# Patient Record
Sex: MALE | Race: WHITE | Employment: FULL TIME | URBAN - NONMETROPOLITAN AREA
[De-identification: names, ages, dates, MRNs, and addresses within clinical notes are randomized per-mention and may not be internally consistent; named-entity substitution may affect disease eponyms.]

---

## 2019-01-17 ENCOUNTER — HOSPITAL ENCOUNTER (OUTPATIENT)
Age: 54
Discharge: HOME | End: 2019-01-17
Payer: COMMERCIAL

## 2019-01-17 ENCOUNTER — HOSPITAL ENCOUNTER (OUTPATIENT)
Dept: GENERAL RADIOLOGY | Age: 54
Discharge: HOME | End: 2019-01-17
Payer: COMMERCIAL

## 2019-01-17 DIAGNOSIS — M79.671 RIGHT FOOT PAIN: ICD-10-CM

## 2019-01-17 PROCEDURE — 73630 X-RAY EXAM OF FOOT: CPT

## 2021-01-20 ENCOUNTER — TELEPHONE (OUTPATIENT)
Dept: SURGERY | Age: 56
End: 2021-01-20

## 2021-02-03 NOTE — PROGRESS NOTES
Mira Serrato MD   General Surgery  New Patient Evaluation in Office  Pt Name: Purnima Everett  Date of Birth 1965   Today's Date: 2/4/2021  Medical Record Number: [id number]  Referring Provider: Brianna Antoine CNP  Primary Care Provider: SONY Boo - CNP  Chief Complaint:  Chief Complaint   Patient presents with    Surgical Consult     NP-mass of left shoulder ref T. Kleman       ASSESSMENT      1. Soft tissue mass    2. Obesity BMI of 49     PLANS      1. Schedule patient for excision of large soft tissue mass left posterior shoulder  2. MAC anesthesia  3. Preoperative testing per anesthesia guidelines. Patient had Covid in December with a positive Covid test.  4.  Risk procedure were discussed with the patient. Risks include but not limited to bleeding, infection, recurrence, scar as well as small potential for malignant diagnoses. Mr. Meche Lima expressed understanding wish to proceed with surgical intervention. Monty Resendez is a 48y.o. year old male who is presenting today in the office for evaluation of a large soft tissue mass left posterior shoulder. Mr. Meche Lima states it has been there for at least 4 months and has rapidly increased in size. He has had no erythema or chills. He had Covid in December but has no residual symptoms per his report. He also complains of left shoulder pain but has no decreased range of motion. He has no history of trauma. He has a large well-circumscribed oval soft tissue mass 10 cm at least in length. Grossly consistent with lipoma. Excision is needed for symptoms and determination of pathology. Small risk of sarcomatous lesion.   4 months large shoulder pain  Past Medical History  Past Medical History:   Diagnosis Date    COVID-19 12/2020    Mass of skin of left shoulder     Obesity        Past Surgical History  Past Surgical History:   Procedure Laterality Date    FOOT SURGERY Left     Dr. Jozef Matias Left Dr Valerio Klein Right     scope       Medications  Current Outpatient Medications   Medication Sig Dispense Refill    ibuprofen (ADVIL;MOTRIN) 400 MG tablet Take 400 mg by mouth every 6 hours as needed for Pain       No current facility-administered medications for this visit.       Allergies   No Known Allergies    Family History  Family History   Problem Relation Age of Onset    No Known Problems Mother     Cancer Father         skin    No Known Problems Brother     No Known Problems Maternal Grandmother     Lung Cancer Maternal Grandfather     COPD Maternal Grandfather     Lung Cancer Paternal Grandmother     No Known Problems Paternal Grandfather        SocialHistory  Social History     Socioeconomic History    Marital status:      Spouse name: Not on file    Number of children: 1    Years of education: Not on file    Highest education level: Not on file   Occupational History    Not on file   Social Needs    Financial resource strain: Not on file    Food insecurity     Worry: Not on file     Inability: Not on file   [employer] needs     Medical: Not on file     Non-medical: Not on file   Tobacco Use    Smoking status: Former Smoker    Smokeless tobacco: Never Used   Substance and Sexual Activity    Alcohol use: Yes     Comment: every other weekend-beer   Raúl.Adelitaas Drug use: Not Currently    Sexual activity: Not on file   Lifestyle    Physical activity     Days per week: Not on file     Minutes per session: Not on file    Stress: Not on file   Relationships    Social connections     Talks on phone: Not on file     Gets together: Not on file     Attends Shinto service: Not on file     Active member of club or organization: Not on file     Attends meetings of clubs or organizations: Not on file     Relationship status: Not on file    Intimate partner violence     Fear of current or ex partner: Not on file     Emotionally abused: Not on file Physically abused: Not on file     Forced sexual activity: Not on file   Other Topics Concern    Not on file   Social History Narrative    Not on file           Review of Systems  Review of Systems   Constitutional: Negative for chills, fatigue, fever and unexpected weight change. HENT: Negative for sore throat, trouble swallowing and voice change. Eyes: Negative for visual disturbance. Respiratory: Negative for cough, shortness of breath and wheezing. Cardiovascular: Negative for chest pain and palpitations. Gastrointestinal: Negative for abdominal pain, blood in stool, nausea and vomiting. Endocrine: Negative for cold intolerance, heat intolerance and polydipsia. Genitourinary: Negative for dysuria, flank pain and hematuria. Musculoskeletal: Negative for gait problem, joint swelling and myalgias. Left shoulder pain   Skin: Negative for color change, rash and wound. Left posterior shoulder for 4 months   Allergic/Immunologic: Negative for immunocompromised state. Neurological: Negative for dizziness, tremors, seizures and speech difficulty. Hematological: Does not bruise/bleed easily. Psychiatric/Behavioral: Negative for behavioral problems and confusion. OBJECTIVE     /60 (Site: Left Upper Arm, Position: Sitting, Cuff Size: Medium Adult)   Pulse 88   Temp 97.3 °F (36.3 °C) (Temporal)   Resp 18   Ht 6' 1\" (1.854 m)   Wt (!) 376 lb (170.6 kg)   SpO2 97%   BMI 49.61 kg/m²      Physical Exam  Vitals signs reviewed. Constitutional:       Appearance: Normal appearance. He is well-developed. He is obese. He is not ill-appearing or diaphoretic. HENT:      Head: Normocephalic and atraumatic. Nose: No congestion. Eyes:      General: No scleral icterus. Extraocular Movements: Extraocular movements intact. Pupils: Pupils are equal, round, and reactive to light. Neck:      Musculoskeletal: Normal range of motion and neck supple. Thyroid: No thyromegaly. Vascular: No JVD. Trachea: No tracheal deviation. Cardiovascular:      Rate and Rhythm: Normal rate and regular rhythm. Heart sounds: Normal heart sounds. Pulmonary:      Effort: Pulmonary effort is normal. No respiratory distress. Breath sounds: Normal breath sounds. No wheezing. Abdominal:      General: There is no distension. Palpations: Abdomen is soft. Musculoskeletal: Normal range of motion. Lymphadenopathy:      Cervical: No cervical adenopathy. Skin:     General: Skin is warm and dry. Coloration: Skin is not jaundiced or pale. Findings: No bruising or rash. Neurological:      General: No focal deficit present. Mental Status: He is alert and oriented to person, place, and time. Cranial Nerves: No cranial nerve deficit. Psychiatric:         Mood and Affect: Mood normal.         Thought Content:  Thought content normal.         No results found for: WBC, HGB, HCT, PLT, CHOL, TRIG, HDL, LDLDIRECT, ALT, AST, NA, K, CL, CREATININE, BUN, CO2, TSH, PSA, INR, GLUF, LABA1C, LABMICR

## 2021-02-04 ENCOUNTER — TELEPHONE (OUTPATIENT)
Dept: SURGERY | Age: 56
End: 2021-02-04

## 2021-02-04 ENCOUNTER — OFFICE VISIT (OUTPATIENT)
Dept: SURGERY | Age: 56
End: 2021-02-04
Payer: COMMERCIAL

## 2021-02-04 VITALS
WEIGHT: 315 LBS | TEMPERATURE: 97.3 F | OXYGEN SATURATION: 97 % | DIASTOLIC BLOOD PRESSURE: 60 MMHG | HEART RATE: 88 BPM | SYSTOLIC BLOOD PRESSURE: 118 MMHG | HEIGHT: 73 IN | BODY MASS INDEX: 41.75 KG/M2 | RESPIRATION RATE: 18 BRPM

## 2021-02-04 DIAGNOSIS — M79.89 SOFT TISSUE MASS: Primary | ICD-10-CM

## 2021-02-04 PROCEDURE — 99242 OFF/OP CONSLTJ NEW/EST SF 20: CPT | Performed by: SURGERY

## 2021-02-04 RX ORDER — IBUPROFEN 400 MG/1
800 TABLET ORAL EVERY 4 HOURS
COMMUNITY

## 2021-02-04 SDOH — ECONOMIC STABILITY: FOOD INSECURITY: WITHIN THE PAST 12 MONTHS, YOU WORRIED THAT YOUR FOOD WOULD RUN OUT BEFORE YOU GOT MONEY TO BUY MORE.: NOT ASKED

## 2021-02-04 SDOH — ECONOMIC STABILITY: TRANSPORTATION INSECURITY
IN THE PAST 12 MONTHS, HAS THE LACK OF TRANSPORTATION KEPT YOU FROM MEDICAL APPOINTMENTS OR FROM GETTING MEDICATIONS?: NOT ASKED

## 2021-02-04 ASSESSMENT — ENCOUNTER SYMPTOMS
WHEEZING: 0
VOMITING: 0
VOICE CHANGE: 0
ABDOMINAL PAIN: 0
TROUBLE SWALLOWING: 0
NAUSEA: 0
SHORTNESS OF BREATH: 0
BLOOD IN STOOL: 0
SORE THROAT: 0
COLOR CHANGE: 0
COUGH: 0

## 2021-02-04 NOTE — LETTER
[address] Surgery  [place] [address] [address] [facility] [place] [address]  Phone: [phone]  Fax: [phone]    Rhonda Hernandez MD        February 4, 2021    Patient: Lukasz Evangelista   MR Number: [id number]   YOB: 1965   Date of Visit: 2/4/2021     Dear Namrata Moran,    Thank you for the request for consultation for Lukasz Evangelista to me for the evaluation of a symptomatic soft tissue mass left posterior shoulder. Below are the relevant portions of my assessment and plan of care. 1. Soft tissue mass    2. Obesity BMI of 49    1. Schedule patient for excision of large soft tissue mass left posterior shoulder  2. MAC anesthesia  3. Preoperative testing per anesthesia guidelines. Patient had Covid in December with a positive Covid test.  4.  Risk procedure were discussed with the patient. Risks include but not limited to bleeding, infection, recurrence, scar as well as small potential for malignant diagnoses. Mr. Elizabeth Blackwell expressed understanding wish to proceed with surgical intervention. If you have questions, please do not hesitate to call me. I look forward to following Shahzad along with you.     Sincerely,          Rhonda Hernandez MD

## 2021-02-04 NOTE — TELEPHONE ENCOUNTER
Ariel charlton/ Virgen @ [facility].  No prior authorization is required for CPT code [id number] under patient's plan

## 2021-02-10 ENCOUNTER — ANESTHESIA EVENT (OUTPATIENT)
Dept: OPERATING ROOM | Age: 56
End: 2021-02-10
Payer: COMMERCIAL

## 2021-02-10 ENCOUNTER — HOSPITAL ENCOUNTER (OUTPATIENT)
Age: 56
Setting detail: OUTPATIENT SURGERY
Discharge: HOME | End: 2021-02-10
Attending: SURGERY | Admitting: SURGERY
Payer: COMMERCIAL

## 2021-02-10 ENCOUNTER — ANESTHESIA (OUTPATIENT)
Dept: OPERATING ROOM | Age: 56
End: 2021-02-10
Payer: COMMERCIAL

## 2021-02-10 VITALS
WEIGHT: 315 LBS | HEIGHT: 73 IN | SYSTOLIC BLOOD PRESSURE: 132 MMHG | HEART RATE: 86 BPM | OXYGEN SATURATION: 95 % | BODY MASS INDEX: 41.75 KG/M2 | RESPIRATION RATE: 14 BRPM | TEMPERATURE: 97.2 F | DIASTOLIC BLOOD PRESSURE: 60 MMHG

## 2021-02-10 VITALS
SYSTOLIC BLOOD PRESSURE: 114 MMHG | OXYGEN SATURATION: 96 % | TEMPERATURE: 96.8 F | DIASTOLIC BLOOD PRESSURE: 56 MMHG | RESPIRATION RATE: 11 BRPM

## 2021-02-10 DIAGNOSIS — M79.89 MASS OF SOFT TISSUE OF LEFT UPPER EXTREMITY: Primary | ICD-10-CM

## 2021-02-10 PROCEDURE — 6360000002 HC RX W HCPCS: Performed by: SURGERY

## 2021-02-10 PROCEDURE — 3600000013 HC SURGERY LEVEL 3 ADDTL 15MIN: Performed by: SURGERY

## 2021-02-10 PROCEDURE — 3700000001 HC ADD 15 MINUTES (ANESTHESIA): Performed by: SURGERY

## 2021-02-10 PROCEDURE — 2500000003 HC RX 250 WO HCPCS: Performed by: SURGERY

## 2021-02-10 PROCEDURE — 23073 EXC SHOULDER TUM DEEP 5 CM/>: CPT | Performed by: SURGERY

## 2021-02-10 PROCEDURE — 6360000002 HC RX W HCPCS: Performed by: NURSE ANESTHETIST, CERTIFIED REGISTERED

## 2021-02-10 PROCEDURE — 2500000003 HC RX 250 WO HCPCS: Performed by: NURSE ANESTHETIST, CERTIFIED REGISTERED

## 2021-02-10 PROCEDURE — 3600000003 HC SURGERY LEVEL 3 BASE: Performed by: SURGERY

## 2021-02-10 PROCEDURE — 2580000003 HC RX 258: Performed by: SURGERY

## 2021-02-10 PROCEDURE — 3700000000 HC ANESTHESIA ATTENDED CARE: Performed by: SURGERY

## 2021-02-10 PROCEDURE — 7100000010 HC PHASE II RECOVERY - FIRST 15 MIN: Performed by: SURGERY

## 2021-02-10 PROCEDURE — 2709999900 HC NON-CHARGEABLE SUPPLY: Performed by: SURGERY

## 2021-02-10 PROCEDURE — 7100000011 HC PHASE II RECOVERY - ADDTL 15 MIN: Performed by: SURGERY

## 2021-02-10 PROCEDURE — 88304 TISSUE EXAM BY PATHOLOGIST: CPT

## 2021-02-10 RX ORDER — SODIUM CHLORIDE 9 MG/ML
INJECTION, SOLUTION INTRAVENOUS CONTINUOUS
Status: DISCONTINUED | OUTPATIENT
Start: 2021-02-10 | End: 2021-02-10 | Stop reason: HOSPADM

## 2021-02-10 RX ORDER — MORPHINE SULFATE 2 MG/ML
2 INJECTION, SOLUTION INTRAMUSCULAR; INTRAVENOUS
Status: DISCONTINUED | OUTPATIENT
Start: 2021-02-10 | End: 2021-02-10 | Stop reason: HOSPADM

## 2021-02-10 RX ORDER — CEFAZOLIN SODIUM 1 G/3ML
INJECTION, POWDER, FOR SOLUTION INTRAMUSCULAR; INTRAVENOUS PRN
Status: DISCONTINUED | OUTPATIENT
Start: 2021-02-10 | End: 2021-02-10 | Stop reason: SDUPTHER

## 2021-02-10 RX ORDER — SODIUM CHLORIDE 0.9 % (FLUSH) 0.9 %
10 SYRINGE (ML) INJECTION PRN
Status: DISCONTINUED | OUTPATIENT
Start: 2021-02-10 | End: 2021-02-10 | Stop reason: HOSPADM

## 2021-02-10 RX ORDER — LIDOCAINE HYDROCHLORIDE 20 MG/ML
INJECTION, SOLUTION EPIDURAL; INFILTRATION; INTRACAUDAL; PERINEURAL PRN
Status: DISCONTINUED | OUTPATIENT
Start: 2021-02-10 | End: 2021-02-10 | Stop reason: SDUPTHER

## 2021-02-10 RX ORDER — HYDROCODONE BITARTRATE AND ACETAMINOPHEN 5; 325 MG/1; MG/1
1 TABLET ORAL EVERY 4 HOURS PRN
Status: DISCONTINUED | OUTPATIENT
Start: 2021-02-10 | End: 2021-02-10 | Stop reason: HOSPADM

## 2021-02-10 RX ORDER — MORPHINE SULFATE 2 MG/ML
4 INJECTION, SOLUTION INTRAMUSCULAR; INTRAVENOUS
Status: DISCONTINUED | OUTPATIENT
Start: 2021-02-10 | End: 2021-02-10 | Stop reason: HOSPADM

## 2021-02-10 RX ORDER — ONDANSETRON 2 MG/ML
4 INJECTION INTRAMUSCULAR; INTRAVENOUS EVERY 6 HOURS PRN
Status: DISCONTINUED | OUTPATIENT
Start: 2021-02-10 | End: 2021-02-10 | Stop reason: HOSPADM

## 2021-02-10 RX ORDER — BUPIVACAINE HYDROCHLORIDE 5 MG/ML
INJECTION, SOLUTION EPIDURAL; INTRACAUDAL PRN
Status: DISCONTINUED | OUTPATIENT
Start: 2021-02-10 | End: 2021-02-10 | Stop reason: ALTCHOICE

## 2021-02-10 RX ORDER — HYDROCODONE BITARTRATE AND ACETAMINOPHEN 5; 325 MG/1; MG/1
2 TABLET ORAL EVERY 4 HOURS PRN
Status: DISCONTINUED | OUTPATIENT
Start: 2021-02-10 | End: 2021-02-10 | Stop reason: HOSPADM

## 2021-02-10 RX ORDER — IBUPROFEN 600 MG/1
600 TABLET ORAL EVERY 6 HOURS PRN
Status: DISCONTINUED | OUTPATIENT
Start: 2021-02-10 | End: 2021-02-10 | Stop reason: HOSPADM

## 2021-02-10 RX ORDER — SODIUM CHLORIDE 0.9 % (FLUSH) 0.9 %
10 SYRINGE (ML) INJECTION EVERY 12 HOURS SCHEDULED
Status: DISCONTINUED | OUTPATIENT
Start: 2021-02-10 | End: 2021-02-10 | Stop reason: HOSPADM

## 2021-02-10 RX ORDER — HYDROCODONE BITARTRATE AND ACETAMINOPHEN 5; 325 MG/1; MG/1
1 TABLET ORAL EVERY 4 HOURS PRN
Qty: 18 TABLET | Refills: 0 | Status: SHIPPED | OUTPATIENT
Start: 2021-02-10 | End: 2021-02-13

## 2021-02-10 RX ORDER — MIDAZOLAM HYDROCHLORIDE 1 MG/ML
INJECTION INTRAMUSCULAR; INTRAVENOUS PRN
Status: DISCONTINUED | OUTPATIENT
Start: 2021-02-10 | End: 2021-02-10 | Stop reason: SDUPTHER

## 2021-02-10 RX ORDER — PROMETHAZINE HYDROCHLORIDE 25 MG/1
12.5 TABLET ORAL EVERY 6 HOURS PRN
Status: DISCONTINUED | OUTPATIENT
Start: 2021-02-10 | End: 2021-02-10 | Stop reason: HOSPADM

## 2021-02-10 RX ORDER — LIDOCAINE HYDROCHLORIDE 20 MG/ML
INJECTION, SOLUTION EPIDURAL; INFILTRATION; INTRACAUDAL; PERINEURAL PRN
Status: DISCONTINUED | OUTPATIENT
Start: 2021-02-10 | End: 2021-02-10 | Stop reason: ALTCHOICE

## 2021-02-10 RX ORDER — PROPOFOL 10 MG/ML
INJECTION, EMULSION INTRAVENOUS PRN
Status: DISCONTINUED | OUTPATIENT
Start: 2021-02-10 | End: 2021-02-10 | Stop reason: SDUPTHER

## 2021-02-10 RX ORDER — FENTANYL CITRATE 50 UG/ML
INJECTION, SOLUTION INTRAMUSCULAR; INTRAVENOUS PRN
Status: DISCONTINUED | OUTPATIENT
Start: 2021-02-10 | End: 2021-02-10 | Stop reason: SDUPTHER

## 2021-02-10 RX ADMIN — PROPOFOL 170 MG: 10 INJECTION, EMULSION INTRAVENOUS at 10:15

## 2021-02-10 RX ADMIN — CEFAZOLIN 2 G: 10 INJECTION, POWDER, FOR SOLUTION INTRAVENOUS at 10:19

## 2021-02-10 RX ADMIN — FENTANYL CITRATE 50 MCG: 50 INJECTION, SOLUTION INTRAMUSCULAR; INTRAVENOUS at 10:29

## 2021-02-10 RX ADMIN — MIDAZOLAM HYDROCHLORIDE 2 MG: 1 INJECTION, SOLUTION INTRAMUSCULAR; INTRAVENOUS at 10:09

## 2021-02-10 RX ADMIN — SODIUM CHLORIDE: 9 INJECTION, SOLUTION INTRAVENOUS at 10:09

## 2021-02-10 RX ADMIN — FENTANYL CITRATE 50 MCG: 50 INJECTION, SOLUTION INTRAMUSCULAR; INTRAVENOUS at 10:23

## 2021-02-10 RX ADMIN — CEFAZOLIN 1000 MG: 1 INJECTION, POWDER, FOR SOLUTION INTRAMUSCULAR; INTRAVENOUS; PARENTERAL at 10:19

## 2021-02-10 RX ADMIN — LIDOCAINE HYDROCHLORIDE 60 MG: 20 INJECTION, SOLUTION EPIDURAL; INFILTRATION; INTRACAUDAL; PERINEURAL at 10:15

## 2021-02-10 ASSESSMENT — PAIN - FUNCTIONAL ASSESSMENT: PAIN_FUNCTIONAL_ASSESSMENT: 0-10

## 2021-02-10 ASSESSMENT — PAIN SCALES - GENERAL: PAINLEVEL_OUTOF10: 0

## 2021-02-10 NOTE — ANESTHESIA POSTPROCEDURE EVALUATION
Department of Anesthesiology  Postprocedure Note    Patient: Cam Trujillo  MRN: [id number]  YOB: 1965  Date of evaluation: 2/10/2021  Time:  10:54 AM     Procedure Summary     Date: 02/10/21 Room / Location: [facility] 04 / [address] [facility]    Anesthesia Start: 1009 Anesthesia Stop: 1054    Procedure: EXCISION LARGE MASS LEFT POSTERIOR SHOULDER (Left ) Diagnosis: (LEFT POSTERIOR SHOULDER MASS)    Surgeons: Kolby Wilcox MD Responsible Provider: Rebecca Arroyo DO    Anesthesia Type: MAC ASA Status: 3          Anesthesia Type: MAC    Bird Phase I:      Bird Phase II:      Last vitals: Reviewed and per EMR flowsheets.        Anesthesia Post Evaluation    Patient location during evaluation: PACU  Patient participation: complete - patient participated  Level of consciousness: awake and alert  Airway patency: patent  Nausea & Vomiting: no nausea and no vomiting  Complications: no  Cardiovascular status: hemodynamically stable  Respiratory status: spontaneous ventilation and acceptable  Hydration status: euvolemic

## 2021-02-10 NOTE — BRIEF OP NOTE
Brief Postoperative Note      Patient: Cam Trujillo  YOB: 1965  MRN: [id number]    Date of Procedure: 2/10/2021    Pre-Op Diagnosis: LEFT POSTERIOR SHOULDER MASS    Post-Op Diagnosis: Same  11 cm subfascial       Procedure(s):  EXCISION LARGE MASS LEFT POSTERIOR SHOULDER, 11 cm subfascial with intermediate closure 14 cm wound    Surgeon(s):  Kolby Wilcox MD    Assistant:  First Assistant: Alaina Liz RN    Anesthesia: Monitor Anesthesia Care    Estimated Blood Loss (mL): Minimal    Complications: None    Specimens:   ID Type Source Tests Collected by Time Destination   A :  Tissue Joint, Shoulder SURGICAL [address], MD 2/10/2021 1025        Implants:  * No implants in log *      Drains: * No LDAs found *    Findings: subfascial fatty mass    Electronically signed by Kolby Wilcox MD on 2/10/2021 at 10:41 AM

## 2021-02-10 NOTE — PROGRESS NOTES
1054-  Patient arrived to phase II via cart. Spontaneous respirations even and unlabored. Placed on monitor--VSS. Report received Surgical RN. 1055-  Assessment completed. Patient is alert and oriented x4. IV capped off-- no complications. Patient denies pain--will monitor. Incision site clean and intact. No drainage present. 1100-  Snack and drink given to patient. Family in room. 1105-  All belongings in room. 1115-  Patient continues to deny pain. 1120-  IV removed-- no complications. Bandage applied. 1125-  Discharge instructions given. Understanding verbalized. Patient dressing. 1134-  Patient discharged in stable condition with all belongings. This RN walked patient to car.

## 2021-02-10 NOTE — ANESTHESIA PRE PROCEDURE
Department of Anesthesiology  Preprocedure Note       Name:  Melia Gagnon   Age:  48 y.o.  :  1965                                          MRN:  [id number]         Date:  2/10/2021      Surgeon: Marguerite Clark):  Pat Garcia MD    Procedure: Procedure(s):  EXCISION LARGE MASS LEFT POSTERIOR SHOULDER    Medications prior to admission:   Prior to Admission medications    Medication Sig Start Date End Date Taking? Authorizing Provider   ibuprofen (ADVIL;MOTRIN) 400 MG tablet Take 400 mg by mouth every 6 hours as needed for Pain   Yes Historical Provider, MD       Current medications:    Current Facility-Administered Medications   Medication Dose Route Frequency Provider Last Rate Last Admin    0.9 % sodium chloride infusion   Intravenous Continuous Pat Garcia MD        sodium chloride flush 0.9 % injection 10 mL  10 mL Intravenous 2 times per day Pat Garcia MD        sodium chloride flush 0.9 % injection 10 mL  10 mL Intravenous PRN Pat Garcia MD        ceFAZolin (ANCEF) 3000 mg in dextrose 5 % 100 mL IVPB  3,000 mg Intravenous On Call to [address] [place] [address], MD           Allergies: Allergies   Allergen Reactions    Pcn [Penicillins] Other (See Comments)     Unknown, happened when younger    Sulfa Antibiotics Other (See Comments)     Unknown, had when he was younger       Problem List:  There is no problem list on file for this patient.       Past Medical History:        Diagnosis Date    COVID-19 2020    Mass of skin of left shoulder     Obesity        Past Surgical History:        Procedure Laterality Date   Joen Duverney Left     Dr. Shannon Murphy tendon    KNEE SURGERY Right     scope       Social History:    Social History     Tobacco Use    Smoking status: Former Smoker    Smokeless tobacco: Never Used   Substance Use Topics    Alcohol use: Yes     Comment: every other weekend-beer                                Counseling given: Not Answered Vital Signs (Current):   Vitals:    02/10/21 0911   BP: 117/70   Pulse: 86   Resp: 16   Temp: 96.9 °F (36.1 °C)   TempSrc: Temporal   SpO2: 95%   Weight: (!) 349 lb (158.3 kg)   Height: 6' 1\" (1.854 m)                                              BP Readings from Last 3 Encounters:   02/10/21 117/70   02/04/21 118/60       NPO Status: Time of last liquid consumption: 2200                        Time of last solid consumption: 2200                        Date of last liquid consumption: 02/09/21                        Date of last solid food consumption: 02/09/21    BMI:   Wt Readings from Last 3 Encounters:   02/10/21 (!) 349 lb (158.3 kg)   02/04/21 (!) 376 lb (170.6 kg)     Body mass index is 46.04 kg/m². CBC: No results found for: WBC, RBC, HGB, HCT, MCV, RDW, PLT    CMP: No results found for: NA, K, CL, CO2, BUN, CREATININE, GFRAA, AGRATIO, LABGLOM, GLUCOSE, PROT, CALCIUM, BILITOT, ALKPHOS, AST, ALT    POC Tests: No results for input(s): POCGLU, POCNA, POCK, POCCL, POCBUN, POCHEMO, POCHCT in the last 72 hours. Coags: No results found for: PROTIME, INR, APTT    HCG (If Applicable): No results found for: PREGTESTUR, PREGSERUM, HCG, HCGQUANT     ABGs: No results found for: PHART, PO2ART, ZCC7VYS, CZM8JVL, BEART, T4HBDRQY     Type & Screen (If Applicable):  No results found for: LABABO, LABRH    Drug/Infectious Status (If Applicable):  No results found for: HIV, HEPCAB    COVID-19 Screening (If Applicable): No results found for: COVID19      Anesthesia Evaluation  Patient summary reviewed  Airway: Mallampati: III  TM distance: >3 FB   Neck ROM: full  Mouth opening: > = 3 FB Dental:          Pulmonary:                              Cardiovascular:                      Neuro/Psych:               GI/Hepatic/Renal:   (+) morbid obesity          Endo/Other:                     Abdominal:           Vascular:                                        Anesthesia Plan      MAC     ASA 3       Induction: intravenous. Anesthetic plan and risks discussed with patient. Plan discussed with CRNA. Percy Bustamante.  [address],    2/10/2021

## 2021-02-10 NOTE — H&P
[address] [place] [address]  History and Physical Update    Pt Name: Cam Trujillo  MRN: [id number]  YOB: 1965  Date of evaluation: 2/10/2021    [x] I have examined the patient and reviewed the H&P/Consult and there are no changes to the patient or plans. [] I have examined the patient and reviewed the H&P/Consult and have noted the following changes:        Kolby Wilcox MD  Electronically signed 2/10/2021 at 6300 Bridger Carrion MD   General Surgery  New Patient Evaluation in Office  Pt Name: Cam Trujillo  Date of Birth 1965   Today's Date: 2/4/2021  Medical Record Number: [id number]  Referring Provider: Mikel Brunner CNP  Primary Care Provider: SONY Awan CNP  Chief Complaint:       Chief Complaint   Patient presents with    Surgical Consult       NP-mass of left shoulder ref T. Klemary jane         ASSESSMENT   1. Soft tissue mass    2. Obesity BMI of 49  PLANS   1. Schedule patient for excision of large soft tissue mass left posterior shoulder  2. MAC anesthesia  3. Preoperative testing per anesthesia guidelines. Patient had Covid in December with a positive Covid test.  4.  Risk procedure were discussed with the patient. Risks include but not limited to bleeding, infection, recurrence, scar as well as small potential for malignant diagnoses. Mr. Johnnie Mcgarry expressed understanding wish to proceed with surgical intervention.         Amparo Cavanaugh is a 48y.o. year old male who is presenting today in the office for evaluation of a large soft tissue mass left posterior shoulder. Mr. Johnnie Mcgarry states it has been there for at least 4 months and has rapidly increased in size. He has had no erythema or chills. He had Covid in December but has no residual symptoms per his report. He also complains of left shoulder pain but has no decreased range of motion. He has no history of trauma. He has a large well-circumscribed oval soft tissue mass 10 cm at least in length.   Grossly consistent with lipoma. Excision is needed for symptoms and determination of pathology. Small risk of sarcomatous lesion. 4 months large shoulder pain  Past Medical History  Past Medical History        Past Medical History:   Diagnosis Date    COVID-19 12/2020    Mass of skin of left shoulder      Obesity            Past Surgical History  Past Surgical History         Past Surgical History:   Procedure Laterality Date    FOOT SURGERY Left       Dr. Alejandro Guadalupe tendon    KNEE SURGERY Right       scope          Medications  Current Facility-Administered Medications          Current Outpatient Medications   Medication Sig Dispense Refill    ibuprofen (ADVIL;MOTRIN) 400 MG tablet Take 400 mg by mouth every 6 hours as needed for Pain          No current facility-administered medications for this visit.          Allergies   No Known Allergies    Family History  Family History         Family History   Problem Relation Age of Onset    No Known Problems Mother      Cancer Father           skin    No Known Problems Brother      No Known Problems Maternal Grandmother      Lung Cancer Maternal Grandfather      COPD Maternal Grandfather      Lung Cancer Paternal Grandmother      No Known Problems Paternal Grandfather            SocialHistory  Social History               Socioeconomic History    Marital status:        Spouse name: Not on file    Number of children: 1    Years of education: Not on file    Highest education level: Not on file   Occupational History    Not on file   Social Needs    Financial resource strain: Not on file    Food insecurity       Worry: Not on file       Inability: Not on file    Transportation needs       Medical: Not on file       Non-medical: Not on file   Tobacco Use    Smoking status: Former Smoker    Smokeless tobacco: Never Used   Substance and Sexual Activity    Alcohol use:  Yes       Comment: every other weekend-fran Hobbs Drug use: Not Currently    Sexual activity: Not on file   Lifestyle    Physical activity       Days per week: Not on file       Minutes per session: Not on file    Stress: Not on file   Relationships    Social connections       Talks on phone: Not on file       Gets together: Not on file       Attends Cheondoism service: Not on file       Active member of club or organization: Not on file       Attends meetings of clubs or organizations: Not on file       Relationship status: Not on file    Intimate partner violence       Fear of current or ex partner: Not on file       Emotionally abused: Not on file       Physically abused: Not on file       Forced sexual activity: Not on file   Other Topics Concern    Not on file   Social History Narrative    Not on file              Review of Systems  Review of Systems   Constitutional: Negative for chills, fatigue, fever and unexpected weight change. HENT: Negative for sore throat, trouble swallowing and voice change. Eyes: Negative for visual disturbance. Respiratory: Negative for cough, shortness of breath and wheezing. Cardiovascular: Negative for chest pain and palpitations. Gastrointestinal: Negative for abdominal pain, blood in stool, nausea and vomiting. Endocrine: Negative for cold intolerance, heat intolerance and polydipsia. Genitourinary: Negative for dysuria, flank pain and hematuria. Musculoskeletal: Negative for gait problem, joint swelling and myalgias. Left shoulder pain   Skin: Negative for color change, rash and wound. Left posterior shoulder for 4 months   Allergic/Immunologic: Negative for immunocompromised state. Neurological: Negative for dizziness, tremors, seizures and speech difficulty. Hematological: Does not bruise/bleed easily.    Psychiatric/Behavioral: Negative for behavioral problems and confusion.         OBJECTIVE      /60 (Site: Left Upper Arm, Position: Sitting, Cuff Size: Medium Adult)   Pulse 88 Temp 97.3 °F (36.3 °C) (Temporal)   Resp 18   Ht 6' 1\" (1.854 m)   Wt (!) 376 lb (170.6 kg)   SpO2 97%   BMI 49.61 kg/m²       Physical Exam  Vitals signs reviewed. Constitutional:       Appearance: Normal appearance. He is well-developed. He is obese. He is not ill-appearing or diaphoretic. HENT:      Head: Normocephalic and atraumatic. Nose: No congestion. Eyes:      General: No scleral icterus. Extraocular Movements: Extraocular movements intact. Pupils: Pupils are equal, round, and reactive to light. Neck:      Musculoskeletal: Normal range of motion and neck supple. Thyroid: No thyromegaly. Vascular: No JVD. Trachea: No tracheal deviation. Cardiovascular:      Rate and Rhythm: Normal rate and regular rhythm. Heart sounds: Normal heart sounds. Pulmonary:      Effort: Pulmonary effort is normal. No respiratory distress. Breath sounds: Normal breath sounds. No wheezing. Abdominal:      General: There is no distension. Palpations: Abdomen is soft. Musculoskeletal: Normal range of motion. Lymphadenopathy:      Cervical: No cervical adenopathy. Skin:     General: Skin is warm and dry. Coloration: Skin is not jaundiced or pale. Findings: No bruising or rash. Neurological:      General: No focal deficit present. Mental Status: He is alert and oriented to person, place, and time. Cranial Nerves: No cranial nerve deficit. Psychiatric:         Mood and Affect: Mood normal.         Thought Content:  Thought content normal.            No results found for: WBC, HGB, HCT, PLT, CHOL, TRIG, HDL, LDLDIRECT, ALT, AST, NA, K, CL, CREATININE, BUN, CO2, TSH, PSA, INR, GLUF, LABA1C, LABMICR

## 2021-02-11 ENCOUNTER — TELEPHONE (OUTPATIENT)
Dept: SURGERY | Age: 56
End: 2021-02-11

## 2021-02-11 NOTE — TELEPHONE ENCOUNTER
Called to check on patient post op, left message asking pt to call the office back with any questions or concerns. Office number given.

## 2021-02-11 NOTE — OP NOTE
[address] [place] [address]                                OPERATIVE REPORT    PATIENT NAME: Rosa M Fall                       :        1965  MED REC NO:   [id number]                           ROOM:  ACCOUNT NO:   [de-identified]                           ADMIT DATE: 02/10/2021  PROVIDER:     Ching Jackson M.D.    DATE OF PROCEDURE:  02/10/2021    PREOPERATIVE DIAGNOSIS:  Enlarging symptomatic soft tissue mass, left  posterior shoulder. POSTOPERATIVE DIAGNOSIS:  Enlarging soft tissue mass, left posterior  shoulder, 11 cm, subfascial.    PROCEDURE:  Excision of 11-cm subfascial soft tissue mass, left  posterior closure with intermediate closure of 14-cm wound. ANESTHESIA:  IV local with sedation, monitored anesthesia care. ESTIMATED BLOOD LOSS:  5 mL. SPECIMENS:  To Pathology. INDICATIONS:  The patient had an enlarging soft tissue mass in left  posterior shoulder which was causing him pain. It was over 10 cm in  size. He opted for excision under MAC anesthesia. OPERATIVE PROCEDURE:  The patient was brought to the operating suite. He was placed in the right lateral decubitus position in a beanbag. After sedation was administered and IV Ancef, the mass and left shoulder  were prepped and draped. Time-out was performed. Incision was made over the mass. Dissection was carried down through  the skin, dermis, and subcutaneous fat. The mass was subfascial over  the musculature of the left shoulder. The fascia was opened and the  fatty mass was excised. Hemostasis was achieved with electrocautery. Fascia was reapproximated with interrupted 2-0 Vicryl suture. Deep  dermis was closed with interrupted 2-0 Vicryl suture. Superficial  dermis was closed with interrupted 3-0 Vicryl suture, and skin was  closed with running subcuticular 4-0 Monocryl suture.   Skin glue was applied. Sponge, sharp, and instrument counts were correct. The  patient was transported to phase II of the recovery area in the surgery  center in stable condition.         Lora Mchugh M.D.    D: 02/10/2021 10:45:48       T: 02/10/2021 10:52:11     [id number]  Job#: [id number]     Doc#: [id number]    CC:

## 2021-02-22 ENCOUNTER — TELEPHONE (OUTPATIENT)
Dept: SURGERY | Age: 56
End: 2021-02-22

## 2021-02-22 NOTE — TELEPHONE ENCOUNTER
Spoke with Dr. Zhao Backer- appointment made for tomorrow at 0900 for poss seroma. Pt made aware. No

## 2021-02-22 NOTE — TELEPHONE ENCOUNTER
S/p 2/10 Excision of 11-cm subfascial soft tissue mass, left posterior closure with intermediate closure of 14-cm wound. Pt called stating that he has swelling at left shoulder where the mass was removed- states that it is as big as it was before surgery. Pt denies any fevers, drainage, redness or irritation. States that the lump is causing him pain at night. Pt advised to apply ice to the area as needed and to use Ibuprofen as needed. Next appt is 3/4. Wonders if he needs to have fluid removed? Any suggestions?

## 2021-02-23 ENCOUNTER — OFFICE VISIT (OUTPATIENT)
Dept: SURGERY | Age: 56
End: 2021-02-23
Payer: COMMERCIAL

## 2021-02-23 VITALS
BODY MASS INDEX: 41.75 KG/M2 | WEIGHT: 315 LBS | OXYGEN SATURATION: 98 % | SYSTOLIC BLOOD PRESSURE: 126 MMHG | TEMPERATURE: 98.2 F | RESPIRATION RATE: 14 BRPM | HEART RATE: 75 BPM | HEIGHT: 73 IN | DIASTOLIC BLOOD PRESSURE: 80 MMHG

## 2021-02-23 DIAGNOSIS — M79.89 SOFT TISSUE MASS: Primary | ICD-10-CM

## 2021-02-23 PROCEDURE — 99024 POSTOP FOLLOW-UP VISIT: CPT | Performed by: SURGERY

## 2021-02-23 PROCEDURE — 10160 PNXR ASPIR ABSC HMTMA BULLA: CPT | Performed by: SURGERY

## 2021-02-23 NOTE — PROGRESS NOTES
Kolby Wilcox MD   General Surgery  Postprocedure Evaluation in Office  Pt Name: Cam Trujillo  Date of Birth 1965   Today's Date: 2/23/2021  Medical Record Number: [id number]  Primary Care Provider: SONY Awan - ANGEL  Chief Complaint   Patient presents with   [facility] Post-Op Check     s/p- Excision of 11-cm subfascial soft tissue mass, left posterior closure with intermediate closure of 14-cm wound  2/10- poss seroma     ASSESSMENT      1. Soft tissue mass    2. Postoperative seroma,, symptomatic   3. Benign lipoma  PLAN       1. Pathology discussed with patient. Benign lipoma  2. Symptomatic seroma aspirated with patient's consent without complication. 3.  Follow-up surgical clinic 1 week for recheck. Call in interim with any questions or concerns. Nathaly Justice is seen today for post-op follow-up. He is seen today early for his postop visit. He complained of swelling of surgical site causing him discomfort. He had a subfascial benign lipoma removed. He has a seroma at the site there is no evidence of infection. Aspirated with his consent without complication. Medications    Current Outpatient Medications:     ibuprofen (ADVIL;MOTRIN) 400 MG tablet, Take 400 mg by mouth every 6 hours as needed for Pain, Disp: , Rfl:     Allergies  Allergies   Allergen Reactions    Pcn [Penicillins] Other (See Comments)     Unknown, happened when younger    Sulfa Antibiotics Other (See Comments)     Unknown, had when he was younger       Review of Systems  History obtained from the patient. Constitutional: Denies any fever, chills, fatigue. Wound: Denies any rash, skin color changes . Complains of painful swelling at wound. Resp: Denies any cough, shortness of breath. CV: Denies any chest pain, orthopnea or syncope. GI: Denies any nausea, vomiting, blood in the stool, constipation or diarrhea. : Denies any hematuria, hesitancy or dysuria.    OBJECTIVE VITALS: /80 (Site: Left Upper Arm, Position: Sitting, Cuff Size: Medium Adult)   Pulse 75   Temp 98.2 °F (36.8 °C) (Temporal)   Resp 14   Ht 6' 1\" (1.854 m)   Wt (!) 361 lb (163.7 kg)   SpO2 98%   BMI 47.63 kg/m²     CONSTITUTIONAL: Alert and oriented times 3, no acute distress and cooperative to examination. SKIN: Skin color, texture, turgor normal. No rashes or lesions. INCISION: wound margins intact and healing well. No signs of infection. No drainage. Palpable swelling consistent with seroma. No bruising or ecchymoses  LUNGS: Lungs Clear  CARDIOVASCULAR: Normal Rate  NEUROLOGIC: No sensory or motor nerve irritation        Performed by: Carli Alegria. Pathology     [place], [place]                     [id number]   Assoc.                                              Page 1 of 8 [phone] [facility]   [address] [place] [id number]                                                       PROC: 02/10/2021   [facility]                                    RECV: 02/10/2021   [address]                                    RPTD: 2021   [address] [place] [id number]                       MRN:  [id number]     LOC: [facility]                       ACCT: [de-identified]  SEX: M                       : 1965  AGE: 48 Y                          PATHOLOGY REPORT                       ATTN: CLEVE Padron                  REQ: Sudeep REYES       Copies To:   ANNA JESSE       Clinical Information: LEFT POSTERIOR SHOULDER MASS     FINAL DIAGNOSIS:   Soft tissue, left posterior shoulder mass, excision:    Adipose tissue consistent with lipoma.    Fat necrosis. Specimen:   SOFT TISSUE, LEFT POSTERIOR SHOULDER MASS       Gross Examination:   The container is labeled Cristina Evangelista, left posterior shoulder mass.    Received in formalin is an unoriented fragment of encapsulated yellow   adipose tissue measuring 8 x 5.5 x about 3 cm.  The specimen is   encapsulated by a thin translucent membrane.  Sections through the

## 2021-03-04 ENCOUNTER — OFFICE VISIT (OUTPATIENT)
Dept: SURGERY | Age: 56
End: 2021-03-04
Payer: COMMERCIAL

## 2021-03-04 VITALS
WEIGHT: 315 LBS | HEIGHT: 73 IN | TEMPERATURE: 96.4 F | RESPIRATION RATE: 18 BRPM | BODY MASS INDEX: 41.75 KG/M2 | OXYGEN SATURATION: 96 % | HEART RATE: 64 BPM | SYSTOLIC BLOOD PRESSURE: 118 MMHG | DIASTOLIC BLOOD PRESSURE: 80 MMHG

## 2021-03-04 DIAGNOSIS — M79.89 SOFT TISSUE MASS: Primary | ICD-10-CM

## 2021-03-04 DIAGNOSIS — L76.34 POSTOPERATIVE SEROMA OF SKIN AFTER NON-DERMATOLOGIC PROCEDURE: ICD-10-CM

## 2021-03-04 PROCEDURE — 99024 POSTOP FOLLOW-UP VISIT: CPT | Performed by: SURGERY

## 2021-03-04 PROCEDURE — 10160 PNXR ASPIR ABSC HMTMA BULLA: CPT | Performed by: SURGERY

## 2021-03-04 NOTE — LETTER
[address] [facility]  [place] [address] [address] [facility] [address]  Phone: [phone]  Fax: [phone]    Yumiko Amezcua MD        March 4, 2021     Patient: Charles Finch   YOB: 1965   Date of Visit: 3/4/2021       To Whom It May Concern:    Anabellasuresh Martin was seen and evaluated for a post op appointment today in my office. Please afford him the time off. If you have any questions or concerns, please don't hesitate to call.     Sincerely,        Yumiko Amezcua MD

## 2021-03-04 NOTE — PROGRESS NOTES
Lissy Camacho MD   General Surgery  Postprocedure Evaluation in Office  Pt Name: Aileen Matamoros  Date of Birth 1965   Today's Date: 3/4/2021  Medical Record Number: [id number]  Primary Care Provider: SONY Lange CNP  Chief Complaint   Patient presents with    Post-Op Check     s/p 2/10 Excision of 11-cm subfascial soft tissue mass, left posterior closure with intermediate closure of 14-cm wound. ASSESSMENT      1. Soft tissue mass    2. Postoperative seroma of skin after non-dermatologic procedure        PLAN       1. Surgical clinic as needed. Call with any questions or concerns. 2.  Symptomatic seroma aspirated with patient's consent without complication. Prateek Babb is seen today for post-op follow-up. He has minimal reaccumulation of seroma. He denies significant discomfort. He wished aspiration today. He is returning to work tomorrow. No signs of infection. Incision is well approximated. Medications    Current Outpatient Medications:     ibuprofen (ADVIL;MOTRIN) 400 MG tablet, Take 400 mg by mouth every 6 hours as needed for Pain, Disp: , Rfl:     Allergies  Allergies   Allergen Reactions    Pcn [Penicillins] Other (See Comments)     Unknown, happened when younger    Sulfa Antibiotics Other (See Comments)     Unknown, had when he was younger       Review of Systems  History obtained from the patient. Constitutional: Denies any fever, chills, fatigue. Wound: Denies any rash, skin color changes . Complains of painful swelling at wound. Resp: Denies any cough, shortness of breath. CV: Denies any chest pain, orthopnea or syncope. GI: Denies any nausea, vomiting, blood in the stool, constipation or diarrhea. : Denies any hematuria, hesitancy or dysuria.    OBJECTIVE VITALS: /80 (Site: Right Upper Arm, Position: Sitting, Cuff Size: Medium Adult)   Pulse 64   Temp 96.4 °F (35.8 °C) (Temporal)   Resp 18   Ht 6' 1\" (1.854 m)   Wt (!) 361 lb (163.7 kg)   SpO2 96%   BMI 47.63 kg/m²     CONSTITUTIONAL: Alert and oriented times 3, no acute distress and cooperative to examination. SKIN: Skin color, texture, turgor normal. No rashes or lesions. INCISION: wound margins intact and healing well. No signs of infection. No drainage. Palpable swelling consistent with seroma. No bruising or ecchymoses  LUNGS: Lungs Clear  CARDIOVASCULAR: Normal Rate  NEUROLOGIC: No sensory or motor nerve irritation        Performed by: Carli Alegria. Pathology     [place], [place]                     [id number]   Assoc.                                              Page 1 of 7 [phone]   [address], [place]                                                        PROC: 02/10/2021   [facility]                                    RECV: 02/10/2021   [address]                                    RPTD: 2021   [address], [place] [address]                       MRN:  [id number]     LOC: [facility]                       ACCT: [de-identified]  SEX: M                       : 1965  AGE: 48 Y                          PATHOLOGY REPORT                       ATTN: CLEVE ERIC   [de-identified]                  REQ: Marquis Scooby REYES       Copies To:   ANNA MOLINA       Clinical Information: LEFT POSTERIOR SHOULDER MASS     FINAL DIAGNOSIS:   Soft tissue, left posterior shoulder mass, excision:    Adipose tissue consistent with lipoma.    Fat necrosis. Specimen:   SOFT TISSUE, LEFT POSTERIOR SHOULDER MASS       Gross Examination:   The container is labeled Sonali Alonso, left posterior shoulder mass.    Received in formalin is an unoriented fragment of encapsulated yellow   adipose tissue measuring 8 x 5.5 x about 3 cm.  The specimen is   encapsulated by a thin translucent membrane.  Sections through the

## 2021-05-17 LAB
ALBUMIN SERPL-MCNC: 4 G/DL
ALP BLD-CCNC: 71 U/L
ALT SERPL-CCNC: 21 U/L
ANION GAP SERPL CALCULATED.3IONS-SCNC: 7 MMOL/L
AST SERPL-CCNC: 18 U/L
AVERAGE GLUCOSE: 114
BILIRUB SERPL-MCNC: 0.7 MG/DL (ref 0.1–1.4)
BUN BLDV-MCNC: 20 MG/DL
CALCIUM SERPL-MCNC: 9.2 MG/DL
CHLORIDE BLD-SCNC: 105 MMOL/L
CHOLESTEROL, TOTAL: 162 MG/DL
CHOLESTEROL/HDL RATIO: 4
CO2: 29 MMOL/L
CREAT SERPL-MCNC: 0.84 MG/DL
GFR CALCULATED: >60
GLUCOSE BLD-MCNC: 96 MG/DL
HBA1C MFR BLD: 5.6 %
HDLC SERPL-MCNC: 41 MG/DL (ref 35–70)
LDL CHOLESTEROL CALCULATED: 103 MG/DL (ref 0–160)
NONHDLC SERPL-MCNC: NORMAL MG/DL
POTASSIUM SERPL-SCNC: 4.3 MMOL/L
SODIUM BLD-SCNC: 141 MMOL/L
TOTAL PROTEIN: 6.9
TRIGL SERPL-MCNC: 89 MG/DL
VLDLC SERPL CALC-MCNC: 18 MG/DL

## 2021-06-14 ENCOUNTER — OFFICE VISIT (OUTPATIENT)
Dept: INTERNAL MEDICINE CLINIC | Age: 56
End: 2021-06-14
Payer: COMMERCIAL

## 2021-06-14 VITALS — HEIGHT: 73 IN | TEMPERATURE: 98.3 F | BODY MASS INDEX: 41.75 KG/M2 | WEIGHT: 315 LBS

## 2021-06-14 DIAGNOSIS — E66.9 OBESITY WITHOUT SERIOUS COMORBIDITY, UNSPECIFIED CLASSIFICATION, UNSPECIFIED OBESITY TYPE: ICD-10-CM

## 2021-06-14 PROCEDURE — 97802 MEDICAL NUTRITION INDIV IN: CPT | Performed by: DIETITIAN, REGISTERED

## 2021-06-14 NOTE — PATIENT INSTRUCTIONS
1. )  Get familiar with reading the nutrition facts label by looking at serving size and total carbohydrates. - Without a label refer to your carb count guide booklet. 2.)  Measure foods for accuracy in carb counting.    3.)  Healthy carb choices:  whole grains, whole wheat pasta, starchy vegetables, fresh fruit and lowfat/non-fat milk and yogurt. 4.)  Your meal plan is found on the inside cover of your carb counting guide booklet:  1st seth or Brkf:  60 gms carbohydrates + 1-2 oz protein  2nd meal or Lunch: 60 gms carbohydrates + 2-3 oz protein + non-starchy veggies  3rd meal or Dinner:  75 gms carbohydrates + 2-3 oz protein + non- starchy veggies    Snack time:  20 - 30 gms carbohydrates + 1 oz protein    5.)  Choose lean protein most of the time and Cut in 1/2 added fats to help with weight loss efforts. 6.) Bring a 1-2 week food log to next dietitian appt. - OR you can use the [employer] for food logging and bring your username and password to next dietitian appt.

## 2021-06-14 NOTE — PROGRESS NOTES
visits:  Temp 98.3 °F (36.8 °C)   Ht 6' 1\" (1.854 m)   Wt (!) 361 lb 9.6 oz (164 kg)   BMI 47.71 kg/m²     -Body mass index is 47.71 kg/m². Greater than 40 - Morbid Obesity / Extreme Obesity / Grade III. -Weight goal: lose weight. Nutrition Diagnosis:   Overweight/Obesity related to Excessive energy intake, sedentary lifestyle, lack of MNT as evidenced by Body mass index is 47.71 kg/m². food recall         Intervention:  -Impression: Pt seemed agreeable to cut back on his milk consumption and in doing the food logging using a fitness ray on his phone. Pt states he is tired a lot and drinking lots of coffee. Pt asked if skipping meals will make him tired. Explained to him that caffeine in the large amounts of coffee he consumes will not provide him the energy he needs. I also explained that taking the large amount of Ibuprofen that he takes along with his coffee consumption is bad for the gut.    -Instructed the patient on: the rationale of eating 3 meals/day, Carbohydrate Counting, Consistent Carbohydrate Intake, Food Label Reading, Meal Planning for Regular, Balanced Meals & Snacks and The Importance of Regular Physical Activity    -Handouts given for: carbohydrate counting, food logging, healthy snacks and sample meal plans/menus. Patient Instructions   1.)  Get familiar with reading the nutrition facts label by looking at serving size and total carbohydrates. - Without a label refer to your carb count guide booklet. 2.)  Measure foods for accuracy in carb counting.    3.)  Healthy carb choices:  whole grains, whole wheat pasta, starchy vegetables, fresh fruit and lowfat/non-fat milk and yogurt.     4.)  Your meal plan is found on the inside cover of your carb counting guide booklet:  1st seth or Brkf:  60 gms carbohydrates + 1-2 oz protein  2nd meal or Lunch: 60 gms carbohydrates + 2-3 oz protein + non-starchy veggies  3rd meal or Dinner:  75 gms carbohydrates + 2-3 oz protein + non- starchy veggies    Snack time:  20 - 30 gms carbohydrates + 1 oz protein    5.)  Choose lean protein most of the time and Cut in 1/2 added fats to help with weight loss efforts. 6.) Bring a 1-2 week food log to next dietitian appt. - OR you can use the FIGS for food logging and bring your username and password to next dietitian appt. -Nutrition prescription: 2000 calories/day, 200 - 225 g carbs/day. Adj wt. 228# (104 kg)    Comprehension verified using teachback method. Monitoring/Evaluation:   -Followup visit: 6 weeks with dietitian.   -Receptiveness to education/goals: Agreeable.  -Evaluation of education: Indicates understanding.  -Readiness to change: precontemplative- eating brkf mid morning at work, cutting back on his milk consumption, balanced meal planning.  -Expected compliance: good. Thank you for your referral of this patient. Total time involved in direct patient education: 60 minutes for initial MNT visit.

## 2021-06-15 ENCOUNTER — INITIAL CONSULT (OUTPATIENT)
Dept: PULMONOLOGY | Age: 56
End: 2021-06-15
Payer: COMMERCIAL

## 2021-06-15 VITALS
WEIGHT: 315 LBS | DIASTOLIC BLOOD PRESSURE: 84 MMHG | TEMPERATURE: 97.7 F | OXYGEN SATURATION: 97 % | HEART RATE: 84 BPM | BODY MASS INDEX: 40.43 KG/M2 | HEIGHT: 74 IN | SYSTOLIC BLOOD PRESSURE: 138 MMHG

## 2021-06-15 DIAGNOSIS — E66.01 MORBID OBESITY WITH BMI OF 45.0-49.9, ADULT (HCC): ICD-10-CM

## 2021-06-15 DIAGNOSIS — R40.0 UNCONTROLLED DAYTIME SOMNOLENCE: ICD-10-CM

## 2021-06-15 DIAGNOSIS — G47.9 RESTLESS SLEEPER: ICD-10-CM

## 2021-06-15 DIAGNOSIS — R25.2 JERKING MOVEMENTS OF EXTREMITIES: ICD-10-CM

## 2021-06-15 DIAGNOSIS — R06.81 WITNESSED EPISODE OF APNEA: Primary | ICD-10-CM

## 2021-06-15 PROCEDURE — 99203 OFFICE O/P NEW LOW 30 MIN: CPT | Performed by: INTERNAL MEDICINE

## 2021-06-15 NOTE — PATIENT INSTRUCTIONS
Patient Education        Learning About Low-Carbohydrate Diets  What is a low-carbohydrate diet? A low-carbohydrate (or \"low-carb\") diet limits foods and drinks that have carbohydrates. This includes grains, fruits, milk and yogurt, and starchy vegetables like potatoes, beans, and corn. It also avoids foods and drinks that have added sugar. Instead, low-carb diets include foods that are high in protein and fat. Why might you follow a low-carb diet? Low-carb diets may be used for a variety of reasons, such as for weight loss. People who have diabetes may use a low-carb diet to help manage their blood sugar levels. What should you do before you start the diet? Talk to your doctor before you try any diet. This is even more important if you have health problems like kidney disease, heart disease, or diabetes. Your doctor may suggest that you meet with a registered dietitian. A dietitian can help you make an eating plan that works for you. What foods do you eat on a low-carb diet? On a low-carb diet, you choose foods that are high in protein and fat. Examples of these are:  · Meat, poultry, and fish. · Eggs. · Nuts, such as walnuts, pecans, almonds, and peanuts. · Peanut butter and other nut butters. · Tofu. · Avocado. · Debbrah Pester. · Non-starchy vegetables like broccoli, cauliflower, green beans, mushrooms, peppers, lettuce, and spinach. · Unsweetened non-dairy milks like almond milk and coconut milk. · Cheese, cottage cheese, and cream cheese. Current as of: December 17, 2020               Content Version: 12.8  © 2006-2021 Healthwise, [employer]. Care instructions adapted under license by [facility] ([facility]). If you have questions about a medical condition or this instruction, always ask your healthcare professional. [address]  any warranty or liability for your use of this information. Patient Education        Learning About CPAP for Sleep Apnea  What is CPAP?      CPAP is a small machine that you use at home every night while you sleep. It increases air pressure in your throat to keep your airway open. When you have sleep apnea, this can help you sleep better so you feel much better. CPAP stands for \"continuous positive airway pressure. \"  The CPAP machine will have one of the following:  · A mask that covers your nose and mouth  · Prongs that fit into your nose  · A mask that covers your nose only, which is the most common type. This type is called NCPAP. The N stands for \"nasal.\"  Why is it done? CPAP is usually the best treatment for obstructive sleep apnea. It is the first treatment choice and the most widely used. CPAP:  · Helps you have more normal sleep, so you feel less sleepy and more alert during the daytime. · May help keep heart failure or other heart problems from getting worse. · May help lower your blood pressure. If you use CPAP, your bed partner may also sleep better. That's because you aren't snoring or restless. Your doctor may suggest CPAP if you have:  · Moderate to severe sleep apnea. · Sleep apnea and coronary artery disease (CAD). · Sleep apnea and heart failure. What are the side effects? Some people who use CPAP have:  · A dry or stuffy nose and a sore throat. · Irritated skin on the face. · Sore eyes. · Bloating. How can you care for yourself? If using CPAP is not comfortable, or if you have certain side effects, work with your doctor to fix them. Here are some things you can try:  · Be sure the mask or nasal prongs fit well. · See if your doctor can adjust the pressure of your CPAP. · If your nose is dry, try a humidifier. · If your nose is runny or stuffy, try decongestant medicine or a steroid nasal spray. Be safe with medicines. Read and follow all instructions on the label. Do not use the medicine longer than the label says. If these things don't help, you might try a different type of machine.  Some machines have air pressure that take.  How can you care for yourself at home? · Lose weight, if needed. · Sleep on your side. It may help mild apnea. · Avoid alcohol and medicines such as sleeping pills, opioids, or sedatives before bed. · Don't smoke. If you need help quitting, talk to your doctor. · Prop up the head of your bed. · Treat breathing problems, such as a stuffy nose, that are caused by a cold or allergies. · Try a continuous positive airway pressure (CPAP) breathing machine if your doctor recommends it. · If CPAP doesn't work for you, ask your doctor if you can try other masks, settings, or breathing machines. · Try oral breathing devices or other nasal devices. · Talk to your doctor if your nose feels dry or bleeds, or if it gets runny or stuffy when you use a breathing machine. · Tell your doctor if you're sleepy during the day and it affects your daily life. Don't drive or operate machinery when you're drowsy. When should you call for help? Watch closely for changes in your health, and be sure to contact your doctor if:    · You still have sleep apnea even though you have made lifestyle changes.     · You are thinking of trying a device such as CPAP.     · You are having problems using a CPAP or similar machine.     · You are still sleepy during the day, and it affects your daily life. Where can you learn more? Go to https://[employer]. org and sign in to your [employer] account. Enter W901 in the Located within [facility] box to learn more about \"Sleep Apnea: Care Instructions. \"     If you do not have an account, please click on the \"Sign Up Now\" link. Current as of: October 26, 2020               Content Version: 12.8  © 4677-7040 Healthwise, [employer]. Care instructions adapted under license by [facility] ([facility]).  If you have questions about a medical condition or this instruction, always ask your healthcare professional. [address]  any warranty or liability for your use of this information.

## 2021-06-15 NOTE — PROGRESS NOTES
New Sleep Patient H/P    Presentation:  Savannah Mejia is referred by [facility] for  ARTEM    Savannah Mejia comes c/o that he snores very loudly, disruptive for his wife, he also reports that according to his wife he stops breathing during the night where his wife has to shake him to wake him au. Wakes up frequently through the night sleeps in short bursts, restless, jerks his legs, very sleepy in the mornings, difficult to wake. Has gained 100 lbs over the last 5 years, lost wt with [facility] but regained it. Time in Bed:   Bedtime: 10p.m. Awakens  5 a.m. Different on weekends? Yes  How? Goes to bed at 11     Shahzad falls asleep in 10  minutes. Any awakenings? Yes  Difficulty Falling back to sleep? No  Symptoms began:  several years ago. Symptoms include: snoring, choking, gasping, periods of not breathing, excessive daytime sleepiness, falling asleep while reading, watching television, disrupted sleep, naps    Previous evaluation and treatment? No       He denies any history of sleep walking or sleep talking. No history of seizures activity. No history suggestive of restless legs syndrome. No history of bruxism. No history of head injury. Naps:  Any naps? Yes and are they helpful Yes    Snoring and Apneas:  Do you snore or been told you a snore? Yes  How long have known about your snoring? years  Any witnessed apneas? Yes  Any awakenings with choking or gasping? Yes    Dreams:  Any recurring dreams? No  Hallucinations? No  Sleep Paralysis? No  Symptoms of Cataplexy? No    Driving History:  Do you have a CDL or drive long distances for work? No  Any driving accidents in the past year? No  Any sleepiness while driving? Yes    Weight:  Any change in weight over the past year? Yes   How about past 5 years? Yes  How much?  100 lbs      Past Medical History:   Diagnosis Date    COVID-19 12/2020    Mass of skin of left shoulder     Obesity        Past Surgical History:   Procedure Laterality Date   Tony Scheuermann Left     Dr. Loraine Gamez tendon    KNEE SURGERY Right     scope    SHOULDER SURGERY Left 2/10/2021    EXCISION LARGE MASS LEFT POSTERIOR SHOULDER performed by Jose Cummings MD at [facility]       Social History     Tobacco Use    Smoking status: Former Smoker    Smokeless tobacco: Never Used   Substance Use Topics    Alcohol use: Yes     Comment: every other weekend-beer    Drug use: Not Currently       Allergies   Allergen Reactions    Pcn [Penicillins] Other (See Comments)     Unknown, happened when younger    Sulfa Antibiotics Other (See Comments)     Unknown, had when he was younger       Current Outpatient Medications   Medication Sig Dispense Refill    ibuprofen (ADVIL;MOTRIN) 400 MG tablet Take 800 mg by mouth every 4 hours        No current facility-administered medications for this visit. Family History   Problem Relation Age of Onset    No Known Problems Mother     Cancer Father         skin    No Known Problems Brother     No Known Problems Maternal Grandmother     Lung Cancer Maternal Grandfather     COPD Maternal Grandfather     Lung Cancer Paternal Grandmother     No Known Problems Paternal Grandfather         Any family history of any sleep problems or any one in your family on CPAP? No    Social History     Tobacco Use    Smoking status: Former Smoker    Smokeless tobacco: Never Used   Substance Use Topics    Alcohol use: Yes     Comment: every other weekend-beer    Drug use: Not Currently     Caffeine Intake: How much soda (pop), coffee, tea, power drinks do you ingest per day? 6 per day. Employment History:  Where do you work? [employer]  What are your shifts? 7 am to 4 pm    Any recent changes in shifts or hours? No    Review of Systems:   General/Constitutional: No recent loss of weight or appetite changes. No fever or chills. HENT: Negative. Eyes: Negative.   Upper respiratory tract: No nasal stuffiness or post nasal drip. Lower respiratory tract/ lungs: No cough or sputum production. No hemoptysis. Cardiovascular: No palpitations or chest pain. Gastrointestinal: No nausea or vomiting. Neurological: No focal neurologiacal weakness. Extremities: No edema. Musculoskeletal: No complaints. Genitourinary: No complaints. Hematological: Negative. Psychiatric/Behavioral: Negative. Skin: No itching. Physical Exam:    HEIGHTHeight: 6' 2\" (188 cm) WEIGHTWeight: (!) 362 lb (164.2 kg)      BMI:  There is no height or weight on file to calculate BMI. Neck Size: 19  Oxygen Sat: room air    SAQLI: 37 ESS: 11   Vitals: There were no vitals taken for this visit. Mallampati Score: 4    Physical Exam :  Constitutional: BMI 46.48  HENT:   Head: Normocephalic and atraumatic. Mouth/Throat: Oropharynx is clear and moist. Large tongue, crowded pharynx, mallampati class 4   Eyes: Conjunctivae are normal. PERRLA. No scleral icterus. Neck: Neck supple. No JVD present. No tracheal deviation present. 19 inches circumference  Cardiovascular: Normal rate, regular rhythm, normal heart sounds. No murmur heard. Pulmonary/Chest: Effort normal and breath sounds normal. No stridor. No respiratory distress. No wheezes. No rales. Abdominal: Soft. No distension. No tenderness. Musculoskeletal: bilateral LE edema   Lymphadenopathy:  No cervical adenopathy. Neurological: Alert and oriented to person, place, and time. No focal deficits. Skin: Skin is warm and dry. Patient is not diaphoretic. Psychiatric: Normal behavior with normal mood and affect. Diagnostic Data:    Assessment    Diagnosis Orders   1. Witnessed episode of apnea  Baseline Diagnostic Sleep Study   2. Uncontrolled daytime somnolence  Baseline Diagnostic Sleep Study   3. Morbid obesity with BMI of 45.0-49.9, adult [facility])  Baseline Diagnostic Sleep Study   4.  Restless sleeper  Baseline Diagnostic Sleep Study   5. Jerking movements of extremities  Baseline Diagnostic Sleep Study       Plan   Orders Placed This Encounter   Procedures    Baseline Diagnostic Sleep Study     Standing Status:   Future     Standing Expiration Date:   6/15/2022     Order Specific Question:   Adult or Pediatric     Answer:   Adult Study (>7 Years)     Order Specific Question:   Location For Sleep Study     Answer:   [facility]     Order Specific Question:   Select Sleep Lab Location     Answer:   [address]        Mask Desensitization and Pre study teaching? No  Weight Loss Information Given? Yes  Sleep Hygiene Discussed? Yes    -He was advised to call [facility] regarding supplies if needed.  -He call my office for earlier appointment if needed for worsening of sleep symptoms.  -Hassan Phoenix educated about my impression and plan. Patient verbalizes understanding.

## 2021-07-21 ENCOUNTER — OFFICE VISIT (OUTPATIENT)
Dept: INTERNAL MEDICINE CLINIC | Age: 56
End: 2021-07-21
Payer: COMMERCIAL

## 2021-07-21 VITALS — TEMPERATURE: 98.3 F | BODY MASS INDEX: 41.75 KG/M2 | WEIGHT: 315 LBS | HEIGHT: 73 IN

## 2021-07-21 DIAGNOSIS — E66.9 OBESITY, UNSPECIFIED CLASSIFICATION, UNSPECIFIED OBESITY TYPE, UNSPECIFIED WHETHER SERIOUS COMORBIDITY PRESENT: ICD-10-CM

## 2021-07-21 PROCEDURE — 97803 MED NUTRITION INDIV SUBSEQ: CPT | Performed by: DIETITIAN, REGISTERED

## 2021-07-21 NOTE — PROGRESS NOTES
[address] [place] [address]. [address] [place] [address]., [facility], [facility]  [phone] (phone)  [phone] (fax)    Patient Name: Alexsandra Michel. Date of Birth: [id number]. MRN: [id number]      Assessment: Patient is a 48 y.o. male seen for follow-up MNT visit for Obesity.     -Nutritionally relevant labs: No results found for: LABA1C, GLUCOSE, CHOL, HDL, LDLCALC, TRIG    Pt using the the myplate ray on his phone for tracking his food intake. Unable to access his account to retrieve food logging. Pt states he is usually well under 2000 calories/day - some days only 1200 calories/day    Small steamer bag of veggies with a healthy choice meal.  Drinking only 1 gallon of milk per week instead of 4 gal. - Pt use to be a big milk drinker and cheese eater. Cut out bologna and hot dogs. Pt c/o Fruit and yogurt made his carb intake too high so cut these food choices out. Pt c/o purchased lean beef and it was very expensive. Pt likes cottage cheese, chicken, nuts. -Food recall:   Breakfast: [place] Instant Brkf. Snack:  medifast pretzels OR [employer] snack & coffee black  Lunch: 1-130 - Healthy Choice meal and bag of veggies. Snack 4 pm - snack as above and coffee  Dinner: Yesterday - 7 - 730 pm - beef and noodles 2 cups, pkg of veggies. Snacks: No snack last night.    -Main Beverages: Found flavor pkts for water - Nicole - sugar free. Drink black coffee at work, Packing a Diet Dr Devine Deamike for lunch at work. Drinks Nicole at home. Eating out less - (use to eat out every Friday and Marina Jay, Ila Marques d's and choosing fried foods and high fat menu options)  One time got Taco Pizza - ~798 calories/slice so had 3 slices. One time went out to [address] rib meal with baked potato then went over in calories that day.   Arbys baked potato with xavier and cheese with a Healthy choice meal.  Avoiding cheeseburgers    -Impression of Dietary Intake: brkf supplement drink, eating 2 meals/day and 2 snacks/day, good intake of vegetable, low in protein, lacking fresh fruit recently. .    Current Outpatient Medications on File Prior to Visit   Medication Sig Dispense Refill    ibuprofen (ADVIL;MOTRIN) 400 MG tablet Take 800 mg by mouth every 4 hours        No current facility-administered medications on file prior to visit. Vitals from current and previous visits:  Temp 98.3 °F (36.8 °C)   Ht 6' 1\" (1.854 m)   Wt (!) 340 lb 6.4 oz (154.4 kg)   BMI 44.91 kg/m²     -Body mass index is 44.91 kg/m². Greater than 40 - Morbid Obesity / Extreme Obesity / Grade III. - Patient lost and 22 pounds over the last 4 weeks. ..  -Weight goal: lose weight. Nutrition Diagnosis:   Obesity related to learning/practicing nutrition recommendations and currently undergoing MNT as evidenced by Food recall. Intervention:  -Impression: Pt has done well in making healthier food choices and planning ahead with healthy meals and snacks for work. Pt not exercising d/t knee are bone on bone and have been hurting more since losing weight.    -Instructed the patient on: Meal Planning for Regular, Balanced Meals & Snacks and see instructions below, handout suggestions.    -Handouts given for: cook and freeze dinners with turkey chili recipe, 5 bad eating habits to kick to the curb, plan your portions, suggestions to fight off hunger. Patient Instructions   Frozen meals; Make sure at least 21 gms protein/meal or higher. Keep added sugars <10 gms added sugars/meal     Aim for at least 500 - 600 calories/meal.    Great job eating veggies. Keep fresh fruit in meal and snack planning. Bring in a 1 week written food log to next dietitian appt - take information off of your myplate ray.    -Nutrition prescription: 2000 calories/day, 225 g carbs (45%), 67 gms fat (30%) and 125 gmns protein/day (25%)    Comprehension verified using teachback method.     Monitoring/Evaluation: -Followup visit: 2 mo with dietitian.   -Receptiveness to education/goals: Agreeable.  -Evaluation of education: Indicates understanding.  -Readiness to change: contemplation - ambivalent about change choosing meals with higher protein content, keeping fresh fruit in meal and snack planning.  -Expected compliance: good. Thank you for your referral of this patient. Total time involved in direct patient education: 45 minutes for follow-up MNT visit.

## 2021-07-21 NOTE — PATIENT INSTRUCTIONS
Frozen meals; Make sure at least 21 gms protein/meal or higher. Keep added sugars <10 gms added sugars/meal     Aim for at least 500 - 600 calories/meal.    Great job eating veggies. Keep fresh fruit in meal and snack planning. Bring in a 1 week written food log to next dietitian appt - take information off of your myplate ray.

## 2021-08-03 ENCOUNTER — TELEPHONE (OUTPATIENT)
Dept: SLEEP CENTER | Age: 56
End: 2021-08-03

## 2021-08-03 DIAGNOSIS — G47.10 HYPERSOMNIA: Primary | ICD-10-CM

## 2021-08-03 NOTE — TELEPHONE ENCOUNTER
EXAM note    Chief Complaint   Patient presents with   • Follow-up     4 month follow up.   • Other     Health Maint due for Flu vaccine.       History   Leelee Aguilar is a pleasant 31 year old female who presents for follow up on chronic medical problems. She is feeling well. She got  on August 1, 2020. Denies any new concerns. She changed her last name and was unable to refill Adderal yesterday due to that.      I have reviewed the past medical, family and social history sections including the medications and allergies listed in the above medical record as well as the nursing notes.     Past Medical History:   Diagnosis Date   • Bacterial vaginosis 04/17/2017   • Encounter for insertion of mirena IUD 11/04/2016   • Malpositioned IUD 04/17/2017    Mirena,  removed and replaced       Current Outpatient Medications   Medication Sig Dispense Refill   • amphetamine-dextroamphetamine (ADDERALL) 10 MG tablet Take 2 tablets in AM then 1 tab at noon. Dx F90.9 90 tablet 0   • levonorgestrel-ethinyl estradiol 0.1-20 MG-MCG per tablet Take 1 tablet by mouth daily. 84 tablet 4   • fluticasone (FLONASE) 50 MCG/ACT nasal spray Spray 2 sprays in each nostril daily. 16 g 0   • cholecalciferol (VITAMIN D3) 1000 UNITS tablet Take 2 tablets by mouth daily. 60 tablet 11     No current facility-administered medications for this visit.        Review of systems    All other systems are reviewed and are negative except as documented in the HPI (history of present illness).    Physical Exam    Vital Signs:    Vitals:    10/13/20 1634   BP: 116/66   Pulse: 62   Temp: 98.3 °F (36.8 °C)   TempSrc: Tympanic   SpO2: 100%   Weight: 75.4 kg   Height: 5' 2\" (1.575 m)   PainSc:  0     Constitutional:  WD/WN (Well developed/well nourished).  In NAD (no acute distress). Appears stated age. Cooperative throughout exam.  HENT:  Normocephalic.  Atraumatic.  Bilateral external ears normal.   Neck:  No masses.  No tenderness.  Supple.  No  Jose has denied the in-lab PSG due to not being medically necessary. If you do not agree with this determination a peer to peer can be done by calling [phone], refer to member ID # [id number]. Please advise. Thank you.    Luciano Marrero stridor.  No JVD (jugular venous distension).  No bruits.  No thyromegaly.  Respiratory:  Normal breath sounds.  No respiratory distress.  No wheezes.  No crackles.  No rhonchi.  Cardiovascular:  Normal heart rate.  Normal rhythm.  No murmurs.  No rubs.  No gallops.   Gastrointestinal:  Bowel sounds positive.  Nontender.  No hepatosplenomegaly.  Extremities:  Negative edema.  Strength 5 out of 5 of all extremities.  Neurologic:  Alert, oriented x3.     ASSESSMENT AND PLAN    Adult ADHD  Comment: Therapeutic lifestyle modifications were discussed with patient in detail and patient verbalized understanding.  All questions were answered.  Plan: amphetamine-dextroamphetamine (ADDERALL) 10 MG         Tablet    Health promotion. She declined influenza vaccine.          Follow up in 3 months or earlier if needed.

## 2021-09-28 ENCOUNTER — TELEPHONE (OUTPATIENT)
Dept: INTERNAL MEDICINE CLINIC | Age: 56
End: 2021-09-28

## 2021-09-30 ENCOUNTER — OFFICE VISIT (OUTPATIENT)
Dept: INTERNAL MEDICINE CLINIC | Age: 56
End: 2021-09-30
Payer: COMMERCIAL

## 2021-09-30 VITALS — HEIGHT: 73 IN | BODY MASS INDEX: 41.75 KG/M2 | TEMPERATURE: 98.4 F | WEIGHT: 315 LBS

## 2021-09-30 DIAGNOSIS — E66.01 MORBID OBESITY (HCC): ICD-10-CM

## 2021-09-30 PROCEDURE — 97803 MED NUTRITION INDIV SUBSEQ: CPT | Performed by: DIETITIAN, REGISTERED

## 2021-09-30 NOTE — PATIENT INSTRUCTIONS
Oikus yogurt is a good choice. Foods that you suspect have added sugars, on the nutrition facts label choose foods with  <8 gms added sugars/serving    Add something more to your lunch in addition to your bar   Eggs, greek yogurt with blueberries. Sodium budget/day = 2000 mg/day. Include fresh fruit in meal and snack planning - try to get 2 servings/day. Allow yourself a variety of food choices. You can allow yourself a burger occ - ex. Once a month and then you have th craving taken care of. Allow a grain serving each day. Get moving - bike, chair exercises    Bring a 1 week food log to next dietitian appt.

## 2021-09-30 NOTE — PROGRESS NOTES
[address] [place] [address]. [address] [place] [address] Rd., Michael [facility], [facility]  [phone] (phone)  [phone] (fax)    Patient Name: Patrice Burrell. Date of Birth: [id number]. MRN: [id number]      Assessment: Patient is a 48 y.o. male seen for follow-up MNT visit for Obesity.     -Nutritionally relevant labs: No results found for: LABA1C, GLUCOSE, CHOL, HDL, LDLCALC, TRIG    Pt brought in 2 high protein bars that he eats - 1 is a snack - 150 marisol/3 gm net carbs (23 gms carbs)/10 gm protein. The other Bar is for his lunch at work - 170 marisol/12 gm protein/21 gms carbs with 8 gms fiber. Pt works 12 hour shifts 6 am - 6 pm. Rotating days, but stays on day shift. Recently purchased an Air fryer - last night made stuffed j peppers - cream cheese and turkey morfin. He ate 6 of these. Other foods he has made in the air fryer - Pork loin, brats, pork roast. Western Samia fries. Apple crisp with oatmeal. [place] ([facility]) morfin. Will make a whole chicken. Will make steaks on the grill. Avoids potatoes. Will eat \"Healthy choice\" meals sometimes with veggies. Does not eat often d/t high in sodium  No food logging.    -Food recall:   Breakfast: CIB  Snack bar. Lunch: bar - occ will eat boiled eggs with this. Black coffee. Dinner: tall glass of milk. Stopped doing the myplate ray on his phone. Pt states he can lose weight by being determined in following a plan, its just when he gets off of a plan he will gain weight back. He did this with [employer]. -Impression of Dietary Intake: on average, 1 meals per day, high snack bar intake, CIB every morning, lacking variety of food choces, lacking whole grains, fruit, and lacking routine intake of vegetables.     Current Outpatient Medications on File Prior to Visit   Medication Sig Dispense Refill    ibuprofen (ADVIL;MOTRIN) 400 MG tablet Take 800 mg by mouth every 4 hours        No current facility-administered medications on file prior to visit.        Meena Cochran from current and previous visits:  Temp 98.4 °F (36.9 °C)   Ht 6' 1\" (1.854 m)   Wt (!) 324 lb 6.4 oz (147.1 kg)   BMI 42.80 kg/m²     -Body mass index is 42.8 kg/m². Greater than 40 - Morbid Obesity / Extreme Obesity / Grade III. - Patient lost and 17 pounds over the last 2 mo. .  -Weight goal: lose weight. Nutrition Diagnosis:   Obesity related to unwilling in learning/practicing nutrition recommendations and currently undergoing MNT as evidenced by Food recall. Intervention:  -Impression: Pt states his knees feel better since the weight loss. Pt is afraid of gaining his weight back as he has done in the past.  When out to eat with others - they may get a burger but he will get grilled chicken even though he loves burgers. Pt states he loves Mcdonalds french fries. Discussed lifestyle changes and it is not about depriving but to allow your self that food you enjoy eating - Ex. Have a burger - but do not have as often as you would have in the past. Not sold on following my plate method of eating meals - avoiding grains, fruit and limited vegetable intake. He has done the yo-yo dieting in the past and afraid he is going this again. Its important for him to get back to balanced meal planning vs deprivation.    -Instructed the patient on: Meal Planning for Regular, Balanced Meals & Snacks, The Importance of Regular Physical Activity and see instructions below. Watching \"You Tube\" chair Exercising    - Handouts - whole grains vs refined grains, quinoa and ways to eat,     Patient Instructions   Oikus yogurt is a good choice. Foods that you suspect have added sugars, on the nutrition facts label choose foods with  <8 gms added sugars/serving    Add something more to your lunch in addition to your bar   Eggs, greek yogurt with blueberries. Sodium budget/day = 2000 mg/day. Include fresh fruit in meal and snack planning - try to get 2 servings/day.     Allow yourself a variety of food choices. You can allow yourself a burger occ - ex. Once a month and then you have th craving taken care of. Allow a grain serving each day. Get moving - bike, chair exercises    Bring a 1 week food log to next dietitian appt. -Nutrition prescription: 2000 calories/day, 225 g carbs/day. Comprehension verified using teachback method. Monitoring/Evaluation:   -Followup visit: 2.5 mo with dietitian.   -Receptiveness to education/goals: Agreeable.  -Evaluation of education: Indicates understanding.  -Readiness to change: contemplation - ambivalent about change adding more variety back into his meal planning.  -Expected compliance: fair. Thank you for your referral of this patient. Total time involved in direct patient education: 45 minutes for follow-up MNT visit.

## 2021-10-04 ENCOUNTER — HOSPITAL ENCOUNTER (OUTPATIENT)
Dept: SLEEP CENTER | Age: 56
Discharge: HOME | End: 2021-10-06
Payer: COMMERCIAL

## 2021-10-04 DIAGNOSIS — G47.10 HYPERSOMNIA: ICD-10-CM

## 2021-10-04 PROCEDURE — 95806 SLEEP STUDY UNATT&RESP EFFT: CPT

## 2021-10-04 NOTE — PROGRESS NOTES
Jia Carrasco presents today for a HST instruction and demonstration on unit # [id number]. Questions were asked and answers given. He was able to return demonstration and verbalized understanding. The sleep center control room phone number was provided incase questions arise during her study. Informed patient to call 911 in case of an emergency. He states he will return the unit tomorrow by 10 am. Covid screen was negative, temperature WNL.

## 2021-10-05 LAB — STATUS: NORMAL

## 2021-10-18 NOTE — PROGRESS NOTES
[address] [place] [address]                               SLEEP STUDY REPORT    PATIENT NAME: Awilda Reynoso                       :        1965  MED REC NO:   [id number]                           ROOM:  ACCOUNT NO:   [de-identified]                           ADMIT DATE: 10/04/2021  PROVIDER:     Amy Martin M.D.    [facility] STUDY:  10/04/2021    HOME SLEEP STUDY REPORT    HISTORY OF PRESENT ILLNESS:  The patient is a 43-year-old, morbidly  obese, with complaints of snoring and nonrestorative sleep. Weight 362  pounds, height 74 inches, BMI 46.5. METHODS:  The patient underwent Type III Portable Monitoring Sleep Study  including the simultaneous recording of oral-nasal airflow, rib and  abdominal respiratory effort, pulse rate, oxygen saturation, and body  position. Sleep staging and scoring followed the standard put forth by  the American Academy of Sleep Medicine and utilized the 1B obstructive  hypopnea event desaturation of 4 percent or greater. DETAILS OF THE STUDY:  The patient came by the sleep lab and picked up  his HST unit #[id number]. He was given instructions how to set up the device. The patient returned the unit the next morning. The information was  successfully downloaded and scored. Total recording time 531 minutes. Lights off time 11:30 p.m., lights on time 08:22 a.m. RESPIRATORY SUMMARY:  45 obstructive apneas, 182 obstructive hypopneas  for an MARIANA of 25. 6.  5 events occurred during supine recording and 222  during non-supine recording. PULSE OXIMETRY SUMMARY:  Mean oxygen saturation 93.6%, lowest oxygen  saturation 74%. There were 29.9 minutes of oxygen saturation below 88%. BODY POSITION SUMMARY:  80 minutes or 15% of total recording time in  supine position, 451 minutes or 85% in non-supine position, 17 minutes  or 3.2% in upright position. ECG SUMMARY:  Normal sinus rhythm.     ASSESSMENT: Obstructive sleep apnea with an MARIANA of 25.6 with majority  of the time spent in non-supine position which could underestimate the  severity of the obstructive events. RECOMMENDATIONS:  Due to the severity of the findings, PAP therapies are  recommended, either APAP mode or to bring the patient to the sleep lab  for a PAP titration polysomnogram.  The patient will be contacted, the  findings and recommendations will be reviewed.         Nic Stratton M.D.    D: 10/17/2021 14:10:30       T: 10/18/2021 3:47:59     [id number]  Job#: [id number]     Doc#: [id number]    CC:

## 2021-10-19 ENCOUNTER — OFFICE VISIT (OUTPATIENT)
Dept: PULMONOLOGY | Age: 56
End: 2021-10-19
Payer: COMMERCIAL

## 2021-10-19 VITALS
HEIGHT: 73 IN | BODY MASS INDEX: 41.75 KG/M2 | WEIGHT: 315 LBS | HEART RATE: 78 BPM | SYSTOLIC BLOOD PRESSURE: 126 MMHG | OXYGEN SATURATION: 97 % | DIASTOLIC BLOOD PRESSURE: 72 MMHG | TEMPERATURE: 97.7 F

## 2021-10-19 DIAGNOSIS — R06.81 WITNESSED APNEIC SPELLS: ICD-10-CM

## 2021-10-19 DIAGNOSIS — G47.33 OBSTRUCTIVE SLEEP APNEA: Primary | ICD-10-CM

## 2021-10-19 DIAGNOSIS — G47.10 HYPERSOMNIA: ICD-10-CM

## 2021-10-19 DIAGNOSIS — E66.01 MORBID OBESITY WITH BMI OF 40.0-44.9, ADULT (HCC): ICD-10-CM

## 2021-10-19 PROCEDURE — 99214 OFFICE O/P EST MOD 30 MIN: CPT | Performed by: PHYSICIAN ASSISTANT

## 2021-10-19 ASSESSMENT — ENCOUNTER SYMPTOMS
SHORTNESS OF BREATH: 0
WHEEZING: 0
BACK PAIN: 0
DIARRHEA: 0
EYES NEGATIVE: 1
CHEST TIGHTNESS: 0
ALLERGIC/IMMUNOLOGIC NEGATIVE: 1
NAUSEA: 0
COUGH: 0
STRIDOR: 0

## 2021-10-19 NOTE — PROGRESS NOTES
Telephone for Pulmonary, CriticalCare and Sleep Medicine    Bryan Ayala, 55 y.o.  [id number]      Pt of [facility]  Nurses Notes   Newton Robles is here today for HST follow up  Study Results  Initial Study Date -  10/4/2021  AHI -  25.6    TotalEvents -   (Apneas  45  Hypopneas 182  Central  0)  LM w/Arousals - n/a  Sleep Efficiency - n/a % (Total Sleep Time - 531.8 min)  Time with Sats below 88% - 29.9 min  Interval History       Bryan Ayala is a 55 y.o. old male who comes in to review the results of his recent sleep study, to answer questions and to explore options for treatment. he continues to have symptoms of snoring, periods of not breathing, excessive daytime sleepiness, feels sleepy during the day, take naps during the day  Allergies  Allergies   Allergen Reactions    Pcn [Penicillins] Other (See Comments)     Unknown, happened when younger    Sulfa Antibiotics Other (See Comments)     Unknown, had when he was younger     Meds  Current Outpatient Medications   Medication Sig Dispense Refill    ibuprofen (ADVIL;MOTRIN) 400 MG tablet Take 800 mg by mouth every 4 hours        No current facility-administered medications for this visit. ROS  Review of Systems   Constitutional: Negative for activity change, appetite change, chills and fever. HENT: Negative for congestion and postnasal drip. Eyes: Negative. Respiratory: Negative for cough, chest tightness, shortness of breath, wheezing and stridor. Cardiovascular: Negative for chest pain and leg swelling. Gastrointestinal: Negative for diarrhea and nausea. Endocrine: Negative. Genitourinary: Negative. Musculoskeletal: Negative. Negative for arthralgias and back pain. Skin: Negative. Allergic/Immunologic: Negative. Neurological: Negative. Negative for dizziness and light-headedness. Psychiatric/Behavioral: Negative. All other systems reviewed and are negative.          Exam  Vitals -  /72 (Site: Left Upper Arm, Position: Sitting, Cuff Size: Large Adult)   Pulse 78   Temp 97.7 °F (36.5 °C) (Temporal)   Ht 6' 1\" (1.854 m)   Wt (!) 327 lb (148.3 kg)   SpO2 97%   BMI 43.14 kg/m²    Body mass index is 43.14 kg/m². Oxygen level - Room Air  Physical Exam  Constitutional:       Appearance: He is well-developed. HENT:      Head: Normocephalic and atraumatic. Right Ear: External ear normal.      Left Ear: External ear normal.   Eyes:      Conjunctiva/sclera: Conjunctivae normal.      Pupils: Pupils are equal, round, and reactive to light. Cardiovascular:      Rate and Rhythm: Normal rate and regular rhythm. Heart sounds: Normal heart sounds. Pulmonary:      Effort: Pulmonary effort is normal.      Breath sounds: Normal breath sounds. Musculoskeletal:         General: Normal range of motion. Cervical back: Normal range of motion and neck supple. Skin:     General: Skin is warm and dry. Neurological:      Mental Status: He is alert and oriented to person, place, and time. Psychiatric:         Behavior: Behavior normal.         Thought Content: Thought content normal.         Judgment: Judgment normal.        Assessment   Diagnosis Orders   1. Obstructive sleep apnea  DME Order for CPAP as OP   2. Hypersomnia     3. Morbid obesity with BMI of 40.0-44.9, adult ([address]     4. Witnessed apneic spells        Recommendations  HST consistent with ARTEM  Several options for treatment were discussed including positional therapy, weight loss and CPAP. The benefits and limitations of each were discussed.     After addressing his  concerns, Donaldo  decided to move ahead with APAP  Follow up 6-8 weeks after PAP set up        Tyshawn Vaughan PA-C, [place]  10/19/2021

## 2022-01-05 ENCOUNTER — OFFICE VISIT (OUTPATIENT)
Dept: INTERNAL MEDICINE CLINIC | Age: 57
End: 2022-01-05
Payer: COMMERCIAL

## 2022-01-05 VITALS — HEIGHT: 73 IN | BODY MASS INDEX: 41.75 KG/M2 | WEIGHT: 315 LBS

## 2022-01-05 DIAGNOSIS — E66.01 MORBID OBESITY (HCC): ICD-10-CM

## 2022-01-05 PROCEDURE — 97803 MED NUTRITION INDIV SUBSEQ: CPT | Performed by: DIETITIAN, REGISTERED

## 2022-01-05 NOTE — PATIENT INSTRUCTIONS
Choose fresh veggies - place them in a baggy so you eat them with your lunch at work or have them at a snack time. Good to add lean protein with your protein bar at work - ex. Chicken breast.  - When having brats only have 1 at a time. Work in more vegetables into your meal and snack planning!!!  - Fresh fruit for snacks is a great idea - 1 cup berries or 1 apple or banana  - Add [place] yogurt with your fruit, if you want. Aim for 3 dairy items each day - greek yogurt and skim milk. Www.heart. org for more weight loss tips and healthy meal and snack planning ideas.     Get in purposeful exercise on your days off.  - try you tube exercises    Bring in a 1-2 week food log to next dietitian appt - thanks

## 2022-01-05 NOTE — PROGRESS NOTES
[address] [place] [address]. [address] [place] [address]., Michael [facility], [facility]  [phone] (phone)  [phone] (fax)    Patient Name: John Bobby. Date of Birth: [id number]. MRN: [id number]      Assessment: Patient is a 55 y.o. male seen for follow-up MNT visit for Obesity.     -Nutritionally relevant labs: No results found for: LABA1C, GLUCOSE, CHOL, HDL, LDLCALC, TRIG  Pt works rotating shifts - 12 hr shifts. Typically stays on day shift.    -Food recall:   Breakfast: skips. Lunch: 2 pkg tuna with 1 bag of salad. When at work protein bar  Will have 2 protein bars that he brings to work and he will have 1 at break-time and 1 at lunch and sometimes will have chicken with this. Dinner: Ex. 5-7 chicken legs Chicken - nothing else with this. Will make in [employer] and he states most of the fat drips off the chicken legs. One time he bought a shrimp ring and ate all these shrimp at one time. Snacks: Pt states his wife many cookies. Pt asked if can have Spinach and herb wraps. Not doing Healthy Choice meals d/t expense. Pt c/o beef is expensive.    -Main Beverages: 1 pot of coffee - black when at home all day. -Impression of Dietary Intake: on average, 2 meals per day, high fat/ cholesterol, lacking routine intake of fruits and vegetables    Current Outpatient Medications on File Prior to Visit   Medication Sig Dispense Refill    ibuprofen (ADVIL;MOTRIN) 400 MG tablet Take 800 mg by mouth every 4 hours        No current facility-administered medications on file prior to visit. Vitals from current and previous visits:  June 2021: Wt 362#  Ht 6' 1\" (1.854 m)   Wt (!) 320 lb (145.2 kg)   BMI 42.22 kg/m²     -Body mass index is 42.22 kg/m². Greater than 40 - Morbid Obesity / Extreme Obesity / Grade III. - Patient lost and 4 pounds over the last 3 mo. .  -Weight goal: lose weight.      Nutrition Diagnosis: Obesity related to currently undergoing MNT as evidenced by Food recall. Intervention:  -Instructed the patient on: Meal Planning for Regular, Balanced Meals & Snacks and The Importance of Regular Physical Activity getting back to doing his phone rya on his phone for food tracking - was doing myplate ray.    -Handouts given for: Food logging, Fancy plate pics, lunch ideas and breakfast ideas. Patient Instructions   Choose fresh veggies - place them in a baggy so you eat them with your lunch at work or have them at a snack time. Good to add lean protein with your protein bar at work - ex. Chicken breast.  - When having brats only have 1 at a time. Work in more vegetables into your meal and snack planning!!!  - Fresh fruit for snacks is a great idea - 1 cup berries or 1 apple or banana  - Add [place] yogurt with your fruit, if you want. Aim for 3 dairy items each day - greek yogurt and skim milk. Www.heart. org for more weight loss tips and healthy meal and snack planning ideas. Get in purposeful exercise on your days off.  - try you tube exercises    Bring in a 1-2 week food log to next dietitian appt - thanks    -Nutrition prescription: 2000 calories/day, 180 - 200 g carbs/day. Comprehension verified using teachback method. Monitoring/Evaluation:   -Followup visit: 8 weeks with dietitian.   -Receptiveness to education/goals: Agreeable.  -Evaluation of education: Indicates understanding.  -Readiness to change: precontemplative- balanced meal planning and cutting back on meat portions and exercising on his days off work. -Expected compliance: fair. Thank you for your referral of this patient. Total time involved in direct patient education: 30 minutes for follow-up MNT visit.

## 2022-01-20 ENCOUNTER — OFFICE VISIT (OUTPATIENT)
Dept: PULMONOLOGY | Age: 57
End: 2022-01-20
Payer: COMMERCIAL

## 2022-01-20 VITALS
WEIGHT: 315 LBS | OXYGEN SATURATION: 98 % | HEART RATE: 74 BPM | BODY MASS INDEX: 41.75 KG/M2 | DIASTOLIC BLOOD PRESSURE: 82 MMHG | TEMPERATURE: 97.7 F | HEIGHT: 73 IN | SYSTOLIC BLOOD PRESSURE: 132 MMHG

## 2022-01-20 DIAGNOSIS — E66.01 MORBID OBESITY WITH BMI OF 40.0-44.9, ADULT (HCC): ICD-10-CM

## 2022-01-20 DIAGNOSIS — G47.33 OBSTRUCTIVE SLEEP APNEA: Primary | ICD-10-CM

## 2022-01-20 PROCEDURE — 99213 OFFICE O/P EST LOW 20 MIN: CPT | Performed by: PHYSICIAN ASSISTANT

## 2022-01-20 ASSESSMENT — ENCOUNTER SYMPTOMS
DIARRHEA: 0
CHEST TIGHTNESS: 0
COUGH: 0
WHEEZING: 0
ALLERGIC/IMMUNOLOGIC NEGATIVE: 1
STRIDOR: 0
NAUSEA: 0
BACK PAIN: 0
EYES NEGATIVE: 1
SHORTNESS OF BREATH: 0

## 2022-01-20 NOTE — PROGRESS NOTES
[facility] for Pulmonary, Critical Care and Sleep Medicine      Cedric Peak         [id number]  1/20/2022   Chief Complaint   Patient presents with    3 Month Follow-Up     ARTEM with download, new set up         Pt of Dr. Abdi GARCIA Download:   Mildred Ren or initial AHI: 25.6     Date of initial study: 10/4/2021      Compliant  80%     Noncompliant 7 %     PAP Type Airsense 11 autoset Level  5/20   Avg Hrs/Day 5 hr 32 min  AHI: 2.8   Recorded compliance dates , 12/20/2021  to 1/18/2022   Machine/Mfg:   [x] ResMed    [] Respironics/Dreamstation   Interface:   [] Nasal    [] Nasal pillows   [x] FFM      Provider:      [x] -DUSTY     []Chilo     [] Javid    [] Lavell Escoto    [] Lavelle               [] [facility]      [] Adaptive    [] [address]      [] Other    Neck Size: 19  Mallampati Mallampati 4  ESS:  2  SAQLI: 95    Here is a scan of the most recent download:              Presentation:   Javier Whitfield presents for sleep medicine follow up for obstructive sleep apnea  Since the last visit, Javier Whitfield is doing well with PAP. He is adjusting well. He is sleeping well and feels rested. Occ dry mouth. Equipment issues: The pressure is  acceptable, the mask is acceptable     Sleep issues:  Do you feel better? Yes  More rested? Yes   Better concentration? yes    Progress History:   Since last visit any new medical issues? Yes Covid  New ER or hospital visits? No  Any new or changes in medicines? No  Any new sleep medicines? No    Review of Systems -   Review of Systems   Constitutional: Negative for activity change, appetite change, chills and fever. HENT: Negative for congestion and postnasal drip. Eyes: Negative. Respiratory: Negative for cough, chest tightness, shortness of breath, wheezing and stridor. Cardiovascular: Negative for chest pain and leg swelling. Gastrointestinal: Negative for diarrhea and nausea. Endocrine: Negative. Genitourinary: Negative. Musculoskeletal: Negative.   Negative for arthralgias and back pain. Skin: Negative. Allergic/Immunologic: Negative. Neurological: Negative. Negative for dizziness and light-headedness. Psychiatric/Behavioral: Negative. All other systems reviewed and are negative. Physical Exam:    BMI:  Body mass index is 41.56 kg/m². Wt Readings from Last 3 Encounters:   01/20/22 (!) 315 lb (142.9 kg)   01/05/22 (!) 320 lb (145.2 kg)   10/19/21 (!) 327 lb (148.3 kg)     Weight stable / unchanged  Vitals: /82 (Site: Left Upper Arm, Position: Sitting, Cuff Size: Large Adult)   Pulse 74   Temp 97.7 °F (36.5 °C) (Temporal)   Ht 6' 1\" (1.854 m)   Wt (!) 315 lb (142.9 kg)   SpO2 98%   BMI 41.56 kg/m²       Physical Exam  Constitutional:       Appearance: He is well-developed. HENT:      Head: Normocephalic and atraumatic. Right Ear: External ear normal.      Left Ear: External ear normal.      Nose: Mucosal edema present. Eyes:      Conjunctiva/sclera: Conjunctivae normal.      Pupils: Pupils are equal, round, and reactive to light. Cardiovascular:      Rate and Rhythm: Normal rate and regular rhythm. Heart sounds: Normal heart sounds. Pulmonary:      Effort: Pulmonary effort is normal.      Breath sounds: Normal breath sounds. Musculoskeletal:         General: Normal range of motion. Cervical back: Normal range of motion and neck supple. Skin:     General: Skin is warm and dry. Neurological:      Mental Status: He is alert and oriented to person, place, and time. Psychiatric:         Behavior: Behavior normal.         Thought Content: Thought content normal.         Judgment: Judgment normal.           ASSESSMENT/DIAGNOSIS     Diagnosis Orders   1. Obstructive sleep apnea     2. Morbid obesity with BMI of 40.0-44.9, adult [facility])              Plan   Do you need any equipment today?  No  - May need ENT eval for nasal obstruction if dry mouth persist.   - Download reviewed and discussed with patient  - He  was

## 2022-03-02 ENCOUNTER — OFFICE VISIT (OUTPATIENT)
Dept: INTERNAL MEDICINE CLINIC | Age: 57
End: 2022-03-02
Payer: COMMERCIAL

## 2022-03-02 VITALS — HEIGHT: 73 IN | WEIGHT: 315 LBS | BODY MASS INDEX: 41.75 KG/M2 | TEMPERATURE: 97.8 F

## 2022-03-02 DIAGNOSIS — E66.01 MORBID OBESITY (HCC): ICD-10-CM

## 2022-03-02 LAB
AVERAGE GLUCOSE: 100
HBA1C MFR BLD: 5.1 %

## 2022-03-02 PROCEDURE — 97803 MED NUTRITION INDIV SUBSEQ: CPT | Performed by: DIETITIAN, REGISTERED

## 2022-03-02 NOTE — PROGRESS NOTES
[address] [place] [address]. [address] [place] [address]., [facility], [address]  [phone] (phone)  [phone] (fax)    Patient Name: Charla Hutton. Date of Birth: [id number]. MRN: [id number]      Assessment: Patient is a 55 y.o. male seen for follow-up MNT visit for Obesity.     -Nutritionally relevant labs: No results found for: LABA1C, GLUCOSE, CHOL, HDL, LDLCALC, TRIG    (After visit today, called PCP for most current lab info)  Most current AIC 12/23/21 -  AIC 5.1%  5/18/21 Chol 162, TG 89, HDL 41,   5/18/21 CMP WNL - no elevation of liver enzymes. 5/18/21 AIC 5.6%    Pt states lunch gets stolen at work so only eats a Granola bar and pbutter crackers pkg with his coffee. Pt works at [employer] and is not allowed to keep a cold pack lunch in his locker. He has to store lunch in a communal lunch room  Pt works 12 hr shifts day hours and rotating days. Pt c/o Awad on protein bars too high so found a protein powder mix to use instead. \"Healthy choice\" meals too high in price.  -Food recall:   Breakfast: Found \"Elevate - protein powder\" so mixes with 10 -12 oz of skim milk. Doing this for breakfast for the past 1 mo. = 30 gms protein/scoop. Pt states cannot find [employer] drink mix in the stores. Dinner: Last night had 5-6 chicken legs with vegetables. .     Family has regular food and he fixes what he needs to eat - high protein low carb. Pt states he got an [address] for [place]. Exercise - wanting to get his bike ready for nice weather. Pt is a coffee drinker during the day 1 pot/day on days off from work. -Impression of Dietary Intake: on average, 1 meals per day, high fat/ cholesterol, lacking fresh fruit, legumes, whole grains.     Current Outpatient Medications on File Prior to Visit   Medication Sig Dispense Refill    ibuprofen (ADVIL;MOTRIN) 400 MG tablet Take 800 mg by mouth every 4 hours        No current facility-administered medications on file prior to visit. Vitals from current and previous visits:  June 2021:  Wt. 362#  Jan 2022:  Wt. 320#  Temp 97.8 °F (36.6 °C)   Ht 6' 1\" (1.854 m)   Wt (!) 316 lb (143.3 kg)   BMI 41.69 kg/m²     -Body mass index is 41.69 kg/m². Greater than 40 - Morbid Obesity / Extreme Obesity / Grade III. - Patient lost and 4 pounds over the last 2 mo. .  -Weight goal: lose weight. Nutrition Diagnosis:   Obesity related to Limited adherence in following nutrition recommendations as evidenced by Food recall. Intervention:  -Impression: pt needs to get in more of a variety of food choices which will provide soluble fiber, other fiber, anti-cancer benefits from phytonutrients. Pt asked if can have ham and beans. Reminded patient of the plate method and he has handouts provided from previous visits.    -Instructed the patient on: Meal Planning for Regular, Balanced Meals & Snacks and The Importance of Regular Physical Activity, preparing meals on days off from work and not to do bars.    -Handouts given for: food logging. Patient Instructions   Follow the plate method of eating.  - so you get in a variety of healthy foods that provide fiber, anti-cancer benefits and more nutrition for your body. Get active on your days off.    - Think about those exercises on your days off - start with 10 min in the morning and 10 minutes again later in the day. Bring a 2 week food log to next dietitian appt. Thanks.    -Nutrition prescription: 1800 - 2000 calories/day, 120 - 130 g carbs/day - pt wants to follow low carb approach to weight loss. Comprehension verified using teachback method.     Monitoring/Evaluation:   -Followup visit: 8 weeks with dietitian.   -Receptiveness to education/goals: Agreeable.  -Evaluation of education: Indicates understanding.  -Readiness to change: precontemplative- adding fresh fruit, whole grains in meal planning.   -Expected compliance: fair.    Thank you for your referral of this patient. Total time involved in direct patient education: 30 minutes for follow-up MNT visit.

## 2022-03-02 NOTE — PATIENT INSTRUCTIONS
Follow the plate method of eating.  - so you get in a variety of healthy foods that provide fiber, anti-cancer benefits and more nutrition for your body. Get active on your days off.    - Think about those exercises on your days off - start with 10 min in the morning and 10 minutes again later in the day. Bring a 2 week food log to next dietitian appt. Thanks.

## 2022-07-12 ENCOUNTER — OFFICE VISIT (OUTPATIENT)
Dept: PULMONOLOGY | Age: 57
End: 2022-07-12
Payer: COMMERCIAL

## 2022-07-12 VITALS
SYSTOLIC BLOOD PRESSURE: 132 MMHG | DIASTOLIC BLOOD PRESSURE: 86 MMHG | TEMPERATURE: 97.2 F | WEIGHT: 311 LBS | OXYGEN SATURATION: 97 % | BODY MASS INDEX: 41.22 KG/M2 | HEIGHT: 73 IN | HEART RATE: 67 BPM

## 2022-07-12 DIAGNOSIS — G47.33 OBSTRUCTIVE SLEEP APNEA: Primary | ICD-10-CM

## 2022-07-12 DIAGNOSIS — E66.01 MORBID OBESITY WITH BMI OF 40.0-44.9, ADULT (HCC): ICD-10-CM

## 2022-07-12 PROCEDURE — 99213 OFFICE O/P EST LOW 20 MIN: CPT | Performed by: PHYSICIAN ASSISTANT

## 2022-07-12 ASSESSMENT — ENCOUNTER SYMPTOMS
ALLERGIC/IMMUNOLOGIC NEGATIVE: 1
BACK PAIN: 0
SHORTNESS OF BREATH: 0
WHEEZING: 0
CHEST TIGHTNESS: 0
EYES NEGATIVE: 1
COUGH: 0
NAUSEA: 0
DIARRHEA: 0
STRIDOR: 0

## 2022-07-12 NOTE — PROGRESS NOTES
[place] for Pulmonary, Critical Care and Sleep Medicine      Matthew Rao         [id number]  7/12/2022   Chief Complaint   Patient presents with    Follow-up     6 month ARTEM follow up         Pt of Dr. Sue Moore    PAP Download:   Original or initial AHI: 25.6     Date of initial study: 10/04/2021      Compliant  73%     Noncompliant 13 %     PAP Type Auto Level  5-20   Avg Hrs/Day 5 hours 33 minutes   AHI: 2.3   Recorded compliance dates , 06/11/2022 to 07/10/2022  Machine/Mfg:   [x] ResMed    [] Respironics/Dreamstation   Interface:   [] Nasal    [] Nasal pillows   [x] FFM      Provider:      [x] SR-HME     [][employer]a     [] [employer]    [] Alyce Night    [] [place]               [] [facility]      [] Adaptive    [] [address]      [] Other    Neck Size: 19  Mallampati Mallampati 4  ESS:  2  SAQLI: 94    Here is a scan of the most recent download:              Presentation:   Dariela Malhotra presents for sleep medicine follow up for obstructive sleep apnea  Since the last visit, Dariela Malhotra is doing ok with PAP. Occ will have trouble sleeping with PAP but rare. He feels more rested with PAP. Equipment issues: The pressure is  acceptable, the mask is acceptable     Sleep issues:  Do you feel better? Yes  More rested? Yes   Better concentration? yes    Progress History:   Since last visit any new medical issues? No  New ER or hospital visits? No  Any new or changes in medicines? No  Any new sleep medicines? No    Review of Systems -   Review of Systems   Constitutional: Negative for activity change, appetite change, chills and fever. HENT: Negative for congestion and postnasal drip. Eyes: Negative. Respiratory: Negative for cough, chest tightness, shortness of breath, wheezing and stridor. Cardiovascular: Negative for chest pain and leg swelling. Gastrointestinal: Negative for diarrhea and nausea. Endocrine: Negative. Genitourinary: Negative. Musculoskeletal: Negative. Negative for arthralgias and back pain. Skin: Negative. Allergic/Immunologic: Negative. Neurological: Negative. Negative for dizziness and light-headedness. Psychiatric/Behavioral: Negative. All other systems reviewed and are negative. Physical Exam:    BMI:  Body mass index is 41.03 kg/m². Wt Readings from Last 3 Encounters:   07/12/22 (!) 311 lb (141.1 kg)   03/02/22 (!) 316 lb (143.3 kg)   01/20/22 (!) 315 lb (142.9 kg)     Weight stable / unchanged  Vitals: /86   Pulse 67   Temp 97.2 °F (36.2 °C)   Ht 6' 1\" (1.854 m)   Wt (!) 311 lb (141.1 kg)   SpO2 97% Comment: r/a  BMI 41.03 kg/m²       Physical Exam  Constitutional:       Appearance: Normal appearance. He is normal weight. HENT:      Head: Normocephalic and atraumatic. Right Ear: External ear normal.      Left Ear: External ear normal.      Nose: Nose normal.   Eyes:      Extraocular Movements: Extraocular movements intact. Conjunctiva/sclera: Conjunctivae normal.      Pupils: Pupils are equal, round, and reactive to light. Pulmonary:      Effort: Pulmonary effort is normal.   Musculoskeletal:      Cervical back: Normal range of motion and neck supple. Neurological:      General: No focal deficit present. Mental Status: He is alert and oriented to person, place, and time. Psychiatric:         Attention and Perception: Attention and perception normal.         Mood and Affect: Mood and affect normal.         Speech: Speech normal.         Behavior: Behavior normal. Behavior is cooperative. Thought Content: Thought content normal.         Cognition and Memory: Cognition normal.         Judgment: Judgment normal.           ASSESSMENT/DIAGNOSIS     Diagnosis Orders   1. Obstructive sleep apnea     2. Morbid obesity with BMI of 40.0-44.9, adult [facility])              Plan   Do you need any equipment today?  Yes update supplies  - Download reviewed and discussed with patient  - He  was advised to continue current positive airway pressure therapy with above described pressure. - He  advised to keep good compliance with current recommended pressure to get optimal results and clinical improvement  - Recommend 7-9 hours of sleep with PAP  - He was advised to call [employer] company regarding supplies if needed.   -He call my office for earlier appointment if needed for worsening of sleep symptoms.   - He was instructed on weight loss  - Lenda Members was educated about my impression and plan. Patient verbalizesunderstanding.   We will see Ronny Ross back in: 1 year with download    Information added by my medical assistant/LPN was reviewed today         Maria T Serrano PA-C, MPAS  7/12/2022

## 2023-07-10 ENCOUNTER — OFFICE VISIT (OUTPATIENT)
Dept: PULMONOLOGY | Age: 58
End: 2023-07-10
Payer: COMMERCIAL

## 2023-07-10 VITALS
SYSTOLIC BLOOD PRESSURE: 134 MMHG | TEMPERATURE: 98 F | DIASTOLIC BLOOD PRESSURE: 86 MMHG | OXYGEN SATURATION: 97 % | HEIGHT: 74 IN | WEIGHT: 315 LBS | BODY MASS INDEX: 40.43 KG/M2 | HEART RATE: 74 BPM

## 2023-07-10 DIAGNOSIS — G47.33 OBSTRUCTIVE SLEEP APNEA: Primary | ICD-10-CM

## 2023-07-10 DIAGNOSIS — E66.01 MORBID OBESITY WITH BMI OF 40.0-44.9, ADULT (HCC): ICD-10-CM

## 2023-07-10 PROCEDURE — 99213 OFFICE O/P EST LOW 20 MIN: CPT | Performed by: PHYSICIAN ASSISTANT

## 2023-07-10 ASSESSMENT — ENCOUNTER SYMPTOMS
COUGH: 0
BACK PAIN: 0
ALLERGIC/IMMUNOLOGIC NEGATIVE: 1
DIARRHEA: 0
EYES NEGATIVE: 1
STRIDOR: 0
SHORTNESS OF BREATH: 0
NAUSEA: 0
WHEEZING: 0
CHEST TIGHTNESS: 0

## 2023-07-10 NOTE — PROGRESS NOTES
[place] for Pulmonary, Critical Care and Sleep Medicine      Jorge Aguirre         [id number]  7/10/2023   Chief Complaint   Patient presents with    Follow-up     1 year mayra        Pt of Dr. Racquel GARCIA Download:   Rupa Xiao or initial AHI: 25.6     Date of initial study: 10/04/2021        Compliant  83%     Noncompliant 13 %     PAP Type AutoSet Level  5/20   Avg Hrs/Day 5.5  AHI: 1.3   Recorded compliance dates [id number]  Machine/Mfg:   [x] ResMed    [] Respironics/Dreamstation   Interface:   [] Nasal    [] Nasal pillows   [x] FFM      Provider:      [x] -DUSTY     []Chilo     [] Javid    [] Yudy Penn    [] Lavelle               [] [facility]      [] Adaptive    [] [address] [facility] [address]:      [] Other    Neck Size: 17  Mallampati 4  ESS:  2  SAQLI: 87    Here is a scan of the most recent download:                    Presentation:   Bernadine Cramer presents for sleep medicine follow up for obstructive sleep apnea  Since the last visit, Bernadine Cramer is doing well with PAP. He is sleeping well. He gets tired, but not getting enough sleep. Equipment issues: The pressure is  acceptable, the mask is acceptable     Sleep issues:  Do you feel better? Yes  More rested? Yes   Better concentration? yes    Progress History:   Since last visit any new medical issues? No  New ER or hospital visits? No  Any new or changes in medicines? No  Any new sleep medicines? No    Review of Systems -   Review of Systems   Constitutional:  Negative for activity change, appetite change, chills and fever. HENT:  Negative for congestion and postnasal drip. Eyes: Negative. Respiratory:  Negative for cough, chest tightness, shortness of breath, wheezing and stridor. Cardiovascular:  Negative for chest pain and leg swelling. Gastrointestinal:  Negative for diarrhea and nausea. Endocrine: Negative. Genitourinary: Negative. Musculoskeletal: Negative. Negative for arthralgias and back pain. Skin: Negative. Allergic/Immunologic: Negative.

## 2024-09-04 NOTE — PROGRESS NOTES
[facility]      Shahzad Rossi         [id number]  9/5/2024   Chief Complaint   Patient presents with    Follow-up     ARTEM 1 year follow up with a download [facility]         Pt of Dr. Cummings    Assessment/Plan   1. Obstructive sleep apnea  -Yearly supply order placed? Yes  -Download reviewed and discussed with patient.  -The symptoms of ARTEM have improved. The patient is benefiting from therapy and should continue use of PAP device. I have reviewed the download.   -Patient's symptoms and AHI are well controlled with current auto settings of 5-20 cmH2O. Continue current pressure settings.  -Advised to continue current positive airway pressure therapy with above described pressure.   -Advised to keep good compliance with current recommended pressure to get optimal results and clinical improvement  -Recommend 7-9 hours of sleep with PAP  -Instructed to call [employer] company regarding supplies if needed.   -Patient to call my office for earlier appointment if needed for worsening of sleep symptoms.   -Patient is not to drive if feeling sleepy    2. Morbid obesity with BMI of 40.0-44.9, adult (HCC) - gained 17 lbs  -Counseled patient on weight loss        Educated about my impression and plan. Patient verbalizes understanding.    Return in about 1 year (around 9/5/2025) for ARTEM. with download.  Subjective     PAP Download:   Original or initial AHI: 25.6     Date of initial study: 10/4/2021      Compliant  93%     Noncompliant 3 %     PAP Type Auto CPAP Level  5-20 cmH2O  Avg Hrs/Day 6 hours and 46 minutes  AHI: 0.9   Recorded compliance dates: 8/5/24 to 9/3/24  Machine/Mfg:   [x] ResMed    [] Respironics/Dreamstation   Interface:   [] Nasal    [] Nasal pillows   [x] FFM      Provider:      [x] [facility]     [][facility]     [] Javid    [] Saniya    [] [place]               [] [facility]      [] [employer]    [] [place]:      [] Other    Neck Size: 18  Mallampati 4  ESS:  2  SAQLI:

## 2024-09-05 ENCOUNTER — OFFICE VISIT (OUTPATIENT)
Dept: PULMONOLOGY | Age: 59
End: 2024-09-05
Payer: COMMERCIAL

## 2024-09-05 VITALS
HEART RATE: 75 BPM | HEIGHT: 74 IN | WEIGHT: 315 LBS | DIASTOLIC BLOOD PRESSURE: 70 MMHG | OXYGEN SATURATION: 96 % | SYSTOLIC BLOOD PRESSURE: 118 MMHG | BODY MASS INDEX: 40.43 KG/M2 | TEMPERATURE: 98.1 F

## 2024-09-05 DIAGNOSIS — G47.33 OBSTRUCTIVE SLEEP APNEA: Primary | ICD-10-CM

## 2024-09-05 DIAGNOSIS — E66.01 MORBID OBESITY WITH BMI OF 40.0-44.9, ADULT (HCC): ICD-10-CM

## 2024-09-05 PROCEDURE — 99214 OFFICE O/P EST MOD 30 MIN: CPT

## 2024-09-05 ASSESSMENT — ENCOUNTER SYMPTOMS
WHEEZING: 0
SORE THROAT: 0
SHORTNESS OF BREATH: 0
RHINORRHEA: 0
COUGH: 0

## (undated) DEVICE — Z INACTIVE SYRINGE BLB IRR

## (undated) DEVICE — SOLUTION IV 1000ML 0.9% SOD CHL PH 5 INJ USP VIAFLX PLAS

## (undated) DEVICE — SYRINGE IRRIG 60ML SFT PLIABLE BLB EZ TO GRP 1 HND USE W/

## (undated) DEVICE — ADHESIVE SKIN CLSR 0.7ML TOP DERMBND ADV

## (undated) DEVICE — SPONGE LAP W18XL18IN WHT COT 4 PLY FLD STRUNG RADPQ DISP ST

## (undated) DEVICE — PACK PROCEDURE SURG PLAS SC MIN SRHP LF

## (undated) DEVICE — GLOVE SURG SZ 7 L12IN FNGR THK94MIL TRNSLUC YEL LTX HYDRGEL

## (undated) DEVICE — TUBING, SUCTION, 1/4" X 12', STRAIGHT: Brand: MEDLINE

## (undated) DEVICE — SHEET, T, LAPAROTOMY, STERILE: Brand: MEDLINE

## (undated) DEVICE — APPLICATOR MEDICATED 26 CC SOLUTION HI LT ORNG CHLORAPREP

## (undated) DEVICE — YANKAUER,BULB TIP,W/O VENT,RIGID,STERILE: Brand: MEDLINE